# Patient Record
Sex: MALE | NOT HISPANIC OR LATINO | Employment: OTHER | URBAN - METROPOLITAN AREA
[De-identification: names, ages, dates, MRNs, and addresses within clinical notes are randomized per-mention and may not be internally consistent; named-entity substitution may affect disease eponyms.]

---

## 2023-08-16 ENCOUNTER — NURSING HOME VISIT (OUTPATIENT)
Dept: POST ACUTE CARE | Facility: EXTERNAL LOCATION | Age: 88
End: 2023-08-16
Payer: MEDICARE

## 2023-08-16 VITALS — DIASTOLIC BLOOD PRESSURE: 60 MMHG | SYSTOLIC BLOOD PRESSURE: 100 MMHG

## 2023-08-16 DIAGNOSIS — R63.0 LOSS OF APPETITE: ICD-10-CM

## 2023-08-16 DIAGNOSIS — R19.7 DIARRHEA, UNSPECIFIED TYPE: ICD-10-CM

## 2023-08-16 DIAGNOSIS — G30.1 MODERATE LATE ONSET ALZHEIMER'S DEMENTIA WITHOUT BEHAVIORAL DISTURBANCE, PSYCHOTIC DISTURBANCE, MOOD DISTURBANCE, OR ANXIETY (MULTI): Primary | ICD-10-CM

## 2023-08-16 DIAGNOSIS — E11.22 TYPE 2 DIABETES MELLITUS WITH STAGE 2 CHRONIC KIDNEY DISEASE, WITHOUT LONG-TERM CURRENT USE OF INSULIN (MULTI): ICD-10-CM

## 2023-08-16 DIAGNOSIS — F02.B0 MODERATE LATE ONSET ALZHEIMER'S DEMENTIA WITHOUT BEHAVIORAL DISTURBANCE, PSYCHOTIC DISTURBANCE, MOOD DISTURBANCE, OR ANXIETY (MULTI): Primary | ICD-10-CM

## 2023-08-16 DIAGNOSIS — N18.2 TYPE 2 DIABETES MELLITUS WITH STAGE 2 CHRONIC KIDNEY DISEASE, WITHOUT LONG-TERM CURRENT USE OF INSULIN (MULTI): ICD-10-CM

## 2023-08-16 PROBLEM — Z85.46 PERSONAL HISTORY OF MALIGNANT NEOPLASM OF PROSTATE: Status: ACTIVE | Noted: 2023-08-16

## 2023-08-16 PROBLEM — I10 PRIMARY HYPERTENSION: Status: ACTIVE | Noted: 2023-08-16

## 2023-08-16 PROBLEM — Z85.528 PERSONAL HISTORY OF MALIGNANT NEOPLASM OF KIDNEY: Status: ACTIVE | Noted: 2023-08-16

## 2023-08-16 PROCEDURE — 99342 HOME/RES VST NEW LOW MDM 30: CPT | Performed by: NURSE PRACTITIONER

## 2023-08-16 NOTE — LETTER
Patient: Alessandra Dong  : 1935    Encounter Date: 2023    Subjective  Alessandra Dong is a 87 y.o. male who is assisted living/ home patient being seen and evaluated for multiple medical problems.    Resident seen today, his wife is requesting a transfer of services to Dr Jones as it is too hard for her to get him to the outside appointments. He is an 87y old male with PMH of DM 2, dementia,HTN,CKD and malignant neoplasm of the prostate. His initial admission date is from 23. Staff has concerns because he has been having some diarrhea and he is not eating or drinking. Discussed with his wife. Medications reviewed, he follows with a cardiologist, Dr Faustin. Prior medical records are not currently found    50% of time was spent on preparing to see the patient, performing exam or evaluation, coordination of care, ordering medications,tests and labs, referring and communicating with other health care providers , interpreting results, obtaining and reviewing history, tests, medications and documenting clinical information  EMR. Time spent >35 minutes             Objective  /60     Physical Exam  Vitals and nursing note reviewed.   Constitutional:       General: He is not in acute distress.  HENT:      Head: Normocephalic and atraumatic.   Eyes:      Pupils: Pupils are equal, round, and reactive to light.   Cardiovascular:      Rate and Rhythm: Normal rate and regular rhythm.   Pulmonary:      Effort: Pulmonary effort is normal.      Breath sounds: Normal breath sounds.   Abdominal:      General: Bowel sounds are normal. There is no distension.      Palpations: Abdomen is soft. There is no mass.      Tenderness: There is no abdominal tenderness. There is no guarding.   Skin:     General: Skin is warm and dry.   Neurological:      Mental Status: He is alert. He is disoriented.   Psychiatric:         Mood and Affect: Mood normal.         Assessment/Plan  Problem List Items Addressed This  Visit       Type 2 diabetes mellitus with stage 2 chronic kidney disease, without long-term current use of insulin (CMS/[facility])     Check A1C         Moderate late onset Alzheimer's dementia without behavioral disturbance, psychotic disturbance, mood disturbance, or anxiety (CMS/[facility]) - Primary     Patient stable, will continue same treatment and monitor. Nursing to inform CNP/MD of any changes in condition or new problems         Loss of appetite     After checking blood work and KUB will consider starting Remeron for appetite stimulant          Other Visit Diagnoses       Diarrhea, unspecified type        Obtain KUB  Imodium 1-2 tabs daily after episode  Check CBC and BMP              Electronically Signed By: АНДРЕЙ Keyes   8/16/23  4:13 PM

## 2023-08-16 NOTE — PROGRESS NOTES
Subjective   Alessandra Dong is a 87 y.o. male who is assisted living/ home patient being seen and evaluated for multiple medical problems.    Resident seen today, his wife is requesting a transfer of services to Dr Burgos and NATHANIEL as it is too hard for her to get him to the outside appointments. He is an 87y old male with PMH of DM 2, dementia,HTN,CKD and malignant neoplasm of the prostate. His initial admission date is from 7/21/23. Staff has concerns because he has been having some diarrhea and he is not eating or drinking. Discussed with his wife. Medications reviewed, he follows with a cardiologist, Dr Faustin. Prior medical records are not currently found    50% of time was spent on preparing to see the patient, performing exam or evaluation, coordination of care, ordering medications,tests and labs, referring and communicating with other health care providers , interpreting results, obtaining and reviewing history, tests, medications and documenting clinical information  EMR. Time spent >35 minutes             Objective   /60     Physical Exam  Vitals and nursing note reviewed.   Constitutional:       General: He is not in acute distress.  HENT:      Head: Normocephalic and atraumatic.   Eyes:      Pupils: Pupils are equal, round, and reactive to light.   Cardiovascular:      Rate and Rhythm: Normal rate and regular rhythm.   Pulmonary:      Effort: Pulmonary effort is normal.      Breath sounds: Normal breath sounds.   Abdominal:      General: Bowel sounds are normal. There is no distension.      Palpations: Abdomen is soft. There is no mass.      Tenderness: There is no abdominal tenderness. There is no guarding.   Skin:     General: Skin is warm and dry.   Neurological:      Mental Status: He is alert. He is disoriented.   Psychiatric:         Mood and Affect: Mood normal.         Assessment/Plan   Problem List Items Addressed This Visit       Type 2 diabetes mellitus with stage 2 chronic kidney  disease, without long-term current use of insulin (CMS/[facility])     Check A1C         Moderate late onset Alzheimer's dementia without behavioral disturbance, psychotic disturbance, mood disturbance, or anxiety (CMS/[facility]) - Primary     Patient stable, will continue same treatment and monitor. Nursing to inform CNP/MD of any changes in condition or new problems         Loss of appetite     After checking blood work and KUB will consider starting Remeron for appetite stimulant          Other Visit Diagnoses       Diarrhea, unspecified type        Obtain KUB  Imodium 1-2 tabs daily after episode  Check CBC and BMP

## 2023-08-21 ENCOUNTER — NURSING HOME VISIT (OUTPATIENT)
Dept: POST ACUTE CARE | Facility: EXTERNAL LOCATION | Age: 88
End: 2023-08-21
Payer: MEDICARE

## 2023-08-21 DIAGNOSIS — Z85.46 PERSONAL HISTORY OF MALIGNANT NEOPLASM OF PROSTATE: ICD-10-CM

## 2023-08-21 DIAGNOSIS — N18.2 TYPE 2 DIABETES MELLITUS WITH STAGE 2 CHRONIC KIDNEY DISEASE, WITHOUT LONG-TERM CURRENT USE OF INSULIN (MULTI): Primary | ICD-10-CM

## 2023-08-21 DIAGNOSIS — E87.6 HYPOKALEMIA: ICD-10-CM

## 2023-08-21 DIAGNOSIS — E11.22 TYPE 2 DIABETES MELLITUS WITH STAGE 2 CHRONIC KIDNEY DISEASE, WITHOUT LONG-TERM CURRENT USE OF INSULIN (MULTI): Primary | ICD-10-CM

## 2023-08-21 DIAGNOSIS — E87.1 HYPONATREMIA: ICD-10-CM

## 2023-08-21 DIAGNOSIS — G30.1 MODERATE LATE ONSET ALZHEIMER'S DEMENTIA WITHOUT BEHAVIORAL DISTURBANCE, PSYCHOTIC DISTURBANCE, MOOD DISTURBANCE, OR ANXIETY (MULTI): ICD-10-CM

## 2023-08-21 DIAGNOSIS — R63.0 LOSS OF APPETITE: ICD-10-CM

## 2023-08-21 DIAGNOSIS — F02.B0 MODERATE LATE ONSET ALZHEIMER'S DEMENTIA WITHOUT BEHAVIORAL DISTURBANCE, PSYCHOTIC DISTURBANCE, MOOD DISTURBANCE, OR ANXIETY (MULTI): ICD-10-CM

## 2023-08-21 DIAGNOSIS — R19.7 DIARRHEA, UNSPECIFIED TYPE: ICD-10-CM

## 2023-08-21 DIAGNOSIS — I10 PRIMARY HYPERTENSION: ICD-10-CM

## 2023-08-21 PROCEDURE — 99349 HOME/RES VST EST MOD MDM 40: CPT | Performed by: INTERNAL MEDICINE

## 2023-08-21 NOTE — LETTER
Patient: Alessandra Dong  : 1935    Encounter Date: 2023    Follow-up visit  Patient has no specific complaints.  Staff reports that the diarrhea has improved if not resolved.  C. difficile stool samples were not processed.    Labs demonstrate a BUN and creatinine of 49 and 2.46 respectively.  Sodium is borderline low at 135 potassium is markedly low at 2.8.  Hemoglobin is 10.3 B12 was 459.  White count is within normal limits.  KUB was unremarkable.    Medications and orders were reviewed.    Review of systems  GEN patient has no specific complaints.  No complaints of pain.  No complaints of orthostatic dizziness.  No GI upset or abdominal pain no shortness of breath.  Staff does report that the patient still has a poor appetite.    Physical exam  Blood pressure is 119/68 and blood pressure trends tend to be borderline low normal, temp is 96, pulse is 90, pulse ox is 95% on room air  HEENT is unremarkable.  Lungs are clear heart rate and rhythm is regular.  Abdomen is soft nontender.  There is no peripheral edema.  Neurologically patient demonstrates no focal tremors or deficits    Assessment and care plan  Advanced dementia with likely a component of adult failure to thrive.  Some of this may be also due to adjustment to the new facility.    The patient did have diarrhea but this appears to have improved although we will monitor this closely.  Because the patient's potassium is low at 2.8 we will add potassium supplementation of 20 mEq twice a day for 6 doses and then 10 mEq daily.  In the meantime since his blood pressure is low normal and there is no edema we will DC his hydrochlorothiazide which is likely not as effective given his advanced kidney disease.  We will decrease his losartan to 50 mg daily and monitor blood pressures and weights accordingly.    Patient does have a history of diabetes mellitus type 2 so we will monitor his Accu-Cheks twice a day and adjust diabetic medications  accordingly.    Because of the patient's abnormal labs we will repeat a CBC magnesium with iron panel folate hemoglobin A1c next week.  We will also pursue a stool for C. difficile if the diarrhea recurs      Electronically Signed By: Tanmay Burgos DO   8/21/23  1:49 PM

## 2023-08-21 NOTE — PROGRESS NOTES
Follow-up visit  Patient has no specific complaints.  Staff reports that the diarrhea has improved if not resolved.  C. difficile stool samples were not processed.    Labs demonstrate a BUN and creatinine of 49 and 2.46 respectively.  Sodium is borderline low at 135 potassium is markedly low at 2.8.  Hemoglobin is 10.3 B12 was 459.  White count is within normal limits.  KUB was unremarkable.    Medications and orders were reviewed.    Review of systems  GEN patient has no specific complaints.  No complaints of pain.  No complaints of orthostatic dizziness.  No GI upset or abdominal pain no shortness of breath.  Staff does report that the patient still has a poor appetite.    Physical exam  Blood pressure is 119/68 and blood pressure trends tend to be borderline low normal, temp is 96, pulse is 90, pulse ox is 95% on room air  HEENT is unremarkable.  Lungs are clear heart rate and rhythm is regular.  Abdomen is soft nontender.  There is no peripheral edema.  Neurologically patient demonstrates no focal tremors or deficits    Assessment and care plan  Advanced dementia with likely a component of adult failure to thrive.  Some of this may be also due to adjustment to the new facility.    The patient did have diarrhea but this appears to have improved although we will monitor this closely.  Because the patient's potassium is low at 2.8 we will add potassium supplementation of 20 mEq twice a day for 6 doses and then 10 mEq daily.  In the meantime since his blood pressure is low normal and there is no edema we will DC his hydrochlorothiazide which is likely not as effective given his advanced kidney disease.  We will decrease his losartan to 50 mg daily and monitor blood pressures and weights accordingly.    Patient does have a history of diabetes mellitus type 2 so we will monitor his Accu-Cheks twice a day and adjust diabetic medications accordingly.    Because of the patient's abnormal labs we will repeat a CBC  magnesium with iron panel folate hemoglobin A1c next week.  We will also pursue a stool for C. difficile if the diarrhea recurs

## 2023-08-29 ENCOUNTER — NURSING HOME VISIT (OUTPATIENT)
Dept: POST ACUTE CARE | Facility: EXTERNAL LOCATION | Age: 88
End: 2023-08-29
Payer: MEDICARE

## 2023-08-29 DIAGNOSIS — G30.1 MODERATE LATE ONSET ALZHEIMER'S DEMENTIA WITHOUT BEHAVIORAL DISTURBANCE, PSYCHOTIC DISTURBANCE, MOOD DISTURBANCE, OR ANXIETY (MULTI): ICD-10-CM

## 2023-08-29 DIAGNOSIS — F02.B0 MODERATE LATE ONSET ALZHEIMER'S DEMENTIA WITHOUT BEHAVIORAL DISTURBANCE, PSYCHOTIC DISTURBANCE, MOOD DISTURBANCE, OR ANXIETY (MULTI): ICD-10-CM

## 2023-08-29 DIAGNOSIS — N18.2 TYPE 2 DIABETES MELLITUS WITH STAGE 2 CHRONIC KIDNEY DISEASE, WITHOUT LONG-TERM CURRENT USE OF INSULIN (MULTI): Primary | ICD-10-CM

## 2023-08-29 DIAGNOSIS — E11.22 TYPE 2 DIABETES MELLITUS WITH STAGE 2 CHRONIC KIDNEY DISEASE, WITHOUT LONG-TERM CURRENT USE OF INSULIN (MULTI): Primary | ICD-10-CM

## 2023-08-29 DIAGNOSIS — L89.152 PRESSURE INJURY OF SACRAL REGION, STAGE 2 (MULTI): ICD-10-CM

## 2023-08-29 DIAGNOSIS — I10 PRIMARY HYPERTENSION: ICD-10-CM

## 2023-08-29 PROCEDURE — 99348 HOME/RES VST EST LOW MDM 30: CPT | Performed by: INTERNAL MEDICINE

## 2023-08-29 NOTE — PROGRESS NOTES
Follow-up visit  Patient has no specific complaints.  Staff reports stage II pressure ulcer on the sacrum and so we have enlisted home health care to help manage this.  Blood pressures are on the low normal side.  He has had a couple of recent falls with no significant injuries.    Glucose trends are acceptable.  Anticipated labs are still pending.    Physical exam  Blood pressure is 109/69, temp is 97.8, pulse 74, pulse ox is 96% on room air he is alert pleasant confused lungs are clear heart rate and rhythm is regular legs demonstrate no edema.    Assessment and care plan advanced dementia but otherwise stable from this standpoint.    Hypertension by history of blood pressures have been low normal and given some recent falls we will continue to titrate down his blood pressure medications decreasing his losartan to 25 mg daily.    His diabetes seems reasonably controlled for the time being we will continue his current medications and monitor Accu-Cheks    Further adjustments in medications will be dependent upon anticipated labs this week

## 2023-08-29 NOTE — LETTER
Patient: Alessandra Dong  : 1935    Encounter Date: 2023    Follow-up visit  Patient has no specific complaints.  Staff reports stage II pressure ulcer on the sacrum and so we have enlisted home health care to help manage this.  Blood pressures are on the low normal side.  He has had a couple of recent falls with no significant injuries.    Glucose trends are acceptable.  Anticipated labs are still pending.    Physical exam  Blood pressure is 109/69, temp is 97.8, pulse 74, pulse ox is 96% on room air he is alert pleasant confused lungs are clear heart rate and rhythm is regular legs demonstrate no edema.    Assessment and care plan advanced dementia but otherwise stable from this standpoint.    Hypertension by history of blood pressures have been low normal and given some recent falls we will continue to titrate down his blood pressure medications decreasing his losartan to 25 mg daily.    His diabetes seems reasonably controlled for the time being we will continue his current medications and monitor Accu-Cheks    Further adjustments in medications will be dependent upon anticipated labs this week      Electronically Signed By: Tanmay Burgos DO   23 10:11 AM

## 2023-09-26 ENCOUNTER — HOSPITAL ENCOUNTER (INPATIENT)
Dept: DATA CONVERSION | Facility: HOSPITAL | Age: 88
LOS: 2 days | Discharge: HOME | DRG: 689 | End: 2023-09-29
Attending: STUDENT IN AN ORGANIZED HEALTH CARE EDUCATION/TRAINING PROGRAM | Admitting: STUDENT IN AN ORGANIZED HEALTH CARE EDUCATION/TRAINING PROGRAM
Payer: MEDICARE

## 2023-09-26 LAB
ACTIVATED PARTIAL THROMBOPLASTIN TIME IN PPP BY COAGULATION ASSAY: 30 SEC (ref 27–38)
ALANINE AMINOTRANSFERASE (SGPT) (U/L) IN SER/PLAS: 25 U/L (ref 10–52)
ALBUMIN (G/DL) IN SER/PLAS: 3.1 G/DL (ref 3.4–5)
ALKALINE PHOSPHATASE (U/L) IN SER/PLAS: 62 U/L (ref 33–136)
ANION GAP IN SER/PLAS: 12 MMOL/L (ref 10–20)
APPEARANCE, URINE: ABNORMAL
ASPARTATE AMINOTRANSFERASE (SGOT) (U/L) IN SER/PLAS: 32 U/L (ref 9–39)
BACTERIA, URINE: ABNORMAL /HPF
BASOPHILS (10*3/UL) IN BLOOD BY AUTOMATED COUNT: 0.04 X10E9/L (ref 0–0.1)
BASOPHILS/100 LEUKOCYTES IN BLOOD BY AUTOMATED COUNT: 0.5 % (ref 0–2)
BILIRUBIN TOTAL (MG/DL) IN SER/PLAS: 0.6 MG/DL (ref 0–1.2)
BILIRUBIN, URINE: NEGATIVE
BLOOD, URINE: ABNORMAL
CALCIUM (MG/DL) IN SER/PLAS: 9.6 MG/DL (ref 8.6–10.3)
CARBON DIOXIDE, TOTAL (MMOL/L) IN SER/PLAS: 24 MMOL/L (ref 21–32)
CHLORIDE (MMOL/L) IN SER/PLAS: 103 MMOL/L (ref 98–107)
COLOR, URINE: ABNORMAL
CREATININE (MG/DL) IN SER/PLAS: 2.43 MG/DL (ref 0.5–1.3)
EOSINOPHILS (10*3/UL) IN BLOOD BY AUTOMATED COUNT: 0.05 X10E9/L (ref 0–0.4)
EOSINOPHILS/100 LEUKOCYTES IN BLOOD BY AUTOMATED COUNT: 0.6 % (ref 0–6)
ERYTHROCYTE DISTRIBUTION WIDTH (RATIO) BY AUTOMATED COUNT: 14.4 % (ref 11.5–14.5)
ERYTHROCYTE MEAN CORPUSCULAR HEMOGLOBIN CONCENTRATION (G/DL) BY AUTOMATED: 33.5 G/DL (ref 32–36)
ERYTHROCYTE MEAN CORPUSCULAR VOLUME (FL) BY AUTOMATED COUNT: 99 FL (ref 80–100)
ERYTHROCYTES (10*6/UL) IN BLOOD BY AUTOMATED COUNT: 3.21 X10E12/L (ref 4.5–5.9)
GFR MALE: 25 ML/MIN/1.73M2
GLUCOSE (MG/DL) IN SER/PLAS: 135 MG/DL (ref 74–99)
GLUCOSE, URINE: NEGATIVE MG/DL
HEMATOCRIT (%) IN BLOOD BY AUTOMATED COUNT: 31.9 % (ref 41–52)
HEMOGLOBIN (G/DL) IN BLOOD: 10.7 G/DL (ref 13.5–17.5)
IMMATURE GRANULOCYTES/100 LEUKOCYTES IN BLOOD BY AUTOMATED COUNT: 0.6 % (ref 0–0.9)
INR IN PPP BY COAGULATION ASSAY: 1.2 (ref 0.9–1.1)
KETONES, URINE: NEGATIVE MG/DL
LACTATE (MMOL/L) IN SER/PLAS: 1.7 MMOL/L (ref 0.4–2)
LEUKOCYTE ESTERASE, URINE: ABNORMAL
LEUKOCYTES (10*3/UL) IN BLOOD BY AUTOMATED COUNT: 7.8 X10E9/L (ref 4.4–11.3)
LYMPHOCYTES (10*3/UL) IN BLOOD BY AUTOMATED COUNT: 2.51 X10E9/L (ref 0.8–3)
LYMPHOCYTES/100 LEUKOCYTES IN BLOOD BY AUTOMATED COUNT: 32.1 % (ref 13–44)
MONOCYTES (10*3/UL) IN BLOOD BY AUTOMATED COUNT: 0.88 X10E9/L (ref 0.05–0.8)
MONOCYTES/100 LEUKOCYTES IN BLOOD BY AUTOMATED COUNT: 11.3 % (ref 2–10)
NEUTROPHILS (10*3/UL) IN BLOOD BY AUTOMATED COUNT: 4.28 X10E9/L (ref 1.6–5.5)
NEUTROPHILS/100 LEUKOCYTES IN BLOOD BY AUTOMATED COUNT: 54.9 % (ref 40–80)
NITRITE, URINE: NEGATIVE
NRBC (PER 100 WBCS) BY AUTOMATED COUNT: 0 /100 WBC (ref 0–0)
PH, URINE: 5 (ref 5–8)
PLATELETS (10*3/UL) IN BLOOD AUTOMATED COUNT: 187 X10E9/L (ref 150–450)
POCT GLUCOSE: 106 MG/DL (ref 74–99)
POCT GLUCOSE: 93 MG/DL (ref 74–99)
POTASSIUM (MMOL/L) IN SER/PLAS: 4.2 MMOL/L (ref 3.5–5.3)
PROTEIN TOTAL: 5.9 G/DL (ref 6.4–8.2)
PROTEIN, URINE: ABNORMAL MG/DL
PROTHROMBIN TIME (PT) IN PPP BY COAGULATION ASSAY: 13.2 SEC (ref 9.8–12.8)
RBC, URINE: ABNORMAL /HPF (ref 0–5)
SARS-COV-2 RESULT: NOT DETECTED
SODIUM (MMOL/L) IN SER/PLAS: 135 MMOL/L (ref 136–145)
SPECIFIC GRAVITY, URINE: 1.01 (ref 1–1.03)
TROPONIN I, HIGH SENSITIVITY: 159 NG/L (ref 0–20)
TROPONIN I, HIGH SENSITIVITY: 208 NG/L (ref 0–20)
TROPONIN I, HIGH SENSITIVITY: 231 NG/L (ref 0–20)
UREA NITROGEN (MG/DL) IN SER/PLAS: 34 MG/DL (ref 6–23)
UROBILINOGEN, URINE: <2 MG/DL (ref 0–1.9)
WBC, URINE: >100 /HPF (ref 0–5)

## 2023-09-26 PROCEDURE — 94761 N-INVAS EAR/PLS OXIMETRY MLT: CPT

## 2023-09-26 PROCEDURE — 87635 SARS-COV-2 COVID-19 AMP PRB: CPT

## 2023-09-26 PROCEDURE — 85025 COMPLETE CBC W/AUTO DIFF WBC: CPT

## 2023-09-26 PROCEDURE — 85730 THROMBOPLASTIN TIME PARTIAL: CPT

## 2023-09-26 PROCEDURE — 85610 PROTHROMBIN TIME: CPT

## 2023-09-26 PROCEDURE — 87040 BLOOD CULTURE FOR BACTERIA: CPT | Mod: 59

## 2023-09-26 PROCEDURE — 87086 URINE CULTURE/COLONY COUNT: CPT

## 2023-09-26 PROCEDURE — 93005 ELECTROCARDIOGRAM TRACING: CPT

## 2023-09-26 PROCEDURE — 80053 COMPREHEN METABOLIC PANEL: CPT

## 2023-09-26 PROCEDURE — 36415 COLL VENOUS BLD VENIPUNCTURE: CPT

## 2023-09-26 PROCEDURE — 71045 X-RAY EXAM CHEST 1 VIEW: CPT

## 2023-09-26 PROCEDURE — 87077 CULTURE AEROBIC IDENTIFY: CPT | Mod: 59

## 2023-09-26 PROCEDURE — 83605 ASSAY OF LACTIC ACID: CPT

## 2023-09-26 PROCEDURE — 84484 ASSAY OF TROPONIN QUANT: CPT | Mod: 91

## 2023-09-26 PROCEDURE — 82947 ASSAY GLUCOSE BLOOD QUANT: CPT | Mod: 91

## 2023-09-26 PROCEDURE — 99285 EMERGENCY DEPT VISIT HI MDM: CPT | Mod: 25

## 2023-09-26 PROCEDURE — 81001 URINALYSIS AUTO W/SCOPE: CPT

## 2023-09-26 PROCEDURE — 87081 CULTURE SCREEN ONLY: CPT | Mod: 59

## 2023-09-26 PROCEDURE — 87186 SC STD MICRODIL/AGAR DIL: CPT

## 2023-09-26 PROCEDURE — 9990 CHARGE CONVERSION: Mod: 59

## 2023-09-27 LAB
ANION GAP IN SER/PLAS: 14 MMOL/L (ref 10–20)
CALCIUM (MG/DL) IN SER/PLAS: 9.2 MG/DL (ref 8.6–10.3)
CARBON DIOXIDE, TOTAL (MMOL/L) IN SER/PLAS: 20 MMOL/L (ref 21–32)
CHLORIDE (MMOL/L) IN SER/PLAS: 108 MMOL/L (ref 98–107)
CREATININE (MG/DL) IN SER/PLAS: 1.83 MG/DL (ref 0.5–1.3)
ERYTHROCYTE DISTRIBUTION WIDTH (RATIO) BY AUTOMATED COUNT: 14.5 % (ref 11.5–14.5)
ERYTHROCYTE MEAN CORPUSCULAR HEMOGLOBIN CONCENTRATION (G/DL) BY AUTOMATED: 32.4 G/DL (ref 32–36)
ERYTHROCYTE MEAN CORPUSCULAR VOLUME (FL) BY AUTOMATED COUNT: 99 FL (ref 80–100)
ERYTHROCYTES (10*6/UL) IN BLOOD BY AUTOMATED COUNT: 2.93 X10E12/L (ref 4.5–5.9)
GFR MALE: 35 ML/MIN/1.73M2
GLUCOSE (MG/DL) IN SER/PLAS: 69 MG/DL (ref 74–99)
HEMATOCRIT (%) IN BLOOD BY AUTOMATED COUNT: 29 % (ref 41–52)
HEMOGLOBIN (G/DL) IN BLOOD: 9.4 G/DL (ref 13.5–17.5)
LEUKOCYTES (10*3/UL) IN BLOOD BY AUTOMATED COUNT: 7.9 X10E9/L (ref 4.4–11.3)
MAGNESIUM (MG/DL) IN SER/PLAS: 1.69 MG/DL (ref 1.6–2.4)
NRBC (PER 100 WBCS) BY AUTOMATED COUNT: 0 /100 WBC (ref 0–0)
PHOSPHATE (MG/DL) IN SER/PLAS: 3.3 MG/DL (ref 2.5–4.9)
PLATELETS (10*3/UL) IN BLOOD AUTOMATED COUNT: 191 X10E9/L (ref 150–450)
POCT GLUCOSE: 68 MG/DL (ref 74–99)
POCT GLUCOSE: 81 MG/DL (ref 74–99)
POCT GLUCOSE: 83 MG/DL (ref 74–99)
POCT GLUCOSE: 88 MG/DL (ref 74–99)
POCT GLUCOSE: 94 MG/DL (ref 74–99)
POTASSIUM (MMOL/L) IN SER/PLAS: 3.8 MMOL/L (ref 3.5–5.3)
SODIUM (MMOL/L) IN SER/PLAS: 138 MMOL/L (ref 136–145)
UREA NITROGEN (MG/DL) IN SER/PLAS: 29 MG/DL (ref 6–23)
VANCOMYCIN (UG/ML) IN SER/PLAS - TROUGH: 8.9 UG/ML (ref 5–20)

## 2023-09-27 PROCEDURE — 85025 COMPLETE CBC W/AUTO DIFF WBC: CPT

## 2023-09-27 PROCEDURE — 84100 ASSAY OF PHOSPHORUS: CPT

## 2023-09-27 PROCEDURE — 99285 EMERGENCY DEPT VISIT HI MDM: CPT

## 2023-09-27 PROCEDURE — 80202 ASSAY OF VANCOMYCIN: CPT

## 2023-09-27 PROCEDURE — 80053 COMPREHEN METABOLIC PANEL: CPT

## 2023-09-27 PROCEDURE — 94761 N-INVAS EAR/PLS OXIMETRY MLT: CPT

## 2023-09-27 PROCEDURE — 81001 URINALYSIS AUTO W/SCOPE: CPT

## 2023-09-27 PROCEDURE — 83605 ASSAY OF LACTIC ACID: CPT

## 2023-09-27 PROCEDURE — 85027 COMPLETE CBC AUTOMATED: CPT

## 2023-09-27 PROCEDURE — 87635 SARS-COV-2 COVID-19 AMP PRB: CPT

## 2023-09-27 PROCEDURE — 85730 THROMBOPLASTIN TIME PARTIAL: CPT

## 2023-09-27 PROCEDURE — 87086 URINE CULTURE/COLONY COUNT: CPT

## 2023-09-27 PROCEDURE — 85610 PROTHROMBIN TIME: CPT

## 2023-09-27 PROCEDURE — 84484 ASSAY OF TROPONIN QUANT: CPT | Mod: 91

## 2023-09-27 PROCEDURE — 82947 ASSAY GLUCOSE BLOOD QUANT: CPT | Mod: 91

## 2023-09-27 PROCEDURE — 87040 BLOOD CULTURE FOR BACTERIA: CPT | Mod: 59

## 2023-09-27 PROCEDURE — 80048 BASIC METABOLIC PNL TOTAL CA: CPT

## 2023-09-27 PROCEDURE — 9990 CHARGE CONVERSION

## 2023-09-27 PROCEDURE — 83735 ASSAY OF MAGNESIUM: CPT

## 2023-09-28 LAB
ANION GAP IN SER/PLAS: 12 MMOL/L (ref 10–20)
CALCIUM (MG/DL) IN SER/PLAS: 9.2 MG/DL (ref 8.6–10.3)
CARBON DIOXIDE, TOTAL (MMOL/L) IN SER/PLAS: 24 MMOL/L (ref 21–32)
CHLORIDE (MMOL/L) IN SER/PLAS: 106 MMOL/L (ref 98–107)
CREATININE (MG/DL) IN SER/PLAS: 1.63 MG/DL (ref 0.5–1.3)
ERYTHROCYTE DISTRIBUTION WIDTH (RATIO) BY AUTOMATED COUNT: 14.4 % (ref 11.5–14.5)
ERYTHROCYTE MEAN CORPUSCULAR HEMOGLOBIN CONCENTRATION (G/DL) BY AUTOMATED: 32.9 G/DL (ref 32–36)
ERYTHROCYTE MEAN CORPUSCULAR VOLUME (FL) BY AUTOMATED COUNT: 99 FL (ref 80–100)
ERYTHROCYTES (10*6/UL) IN BLOOD BY AUTOMATED COUNT: 3.04 X10E12/L (ref 4.5–5.9)
GFR MALE: 40 ML/MIN/1.73M2
GLUCOSE (MG/DL) IN SER/PLAS: 120 MG/DL (ref 74–99)
HEMATOCRIT (%) IN BLOOD BY AUTOMATED COUNT: 30.1 % (ref 41–52)
HEMOGLOBIN (G/DL) IN BLOOD: 9.9 G/DL (ref 13.5–17.5)
LEUKOCYTES (10*3/UL) IN BLOOD BY AUTOMATED COUNT: 7.4 X10E9/L (ref 4.4–11.3)
MAGNESIUM (MG/DL) IN SER/PLAS: 1.79 MG/DL (ref 1.6–2.4)
NRBC (PER 100 WBCS) BY AUTOMATED COUNT: 0 /100 WBC (ref 0–0)
PLATELETS (10*3/UL) IN BLOOD AUTOMATED COUNT: 204 X10E9/L (ref 150–450)
POCT GLUCOSE: 122 MG/DL (ref 74–99)
POCT GLUCOSE: 149 MG/DL (ref 74–99)
POCT GLUCOSE: 171 MG/DL (ref 74–99)
POCT GLUCOSE: 194 MG/DL (ref 74–99)
POTASSIUM (MMOL/L) IN SER/PLAS: 3.7 MMOL/L (ref 3.5–5.3)
SODIUM (MMOL/L) IN SER/PLAS: 138 MMOL/L (ref 136–145)
STAPH/MRSA SCREEN, CULTURE: NORMAL
UREA NITROGEN (MG/DL) IN SER/PLAS: 28 MG/DL (ref 6–23)
URINE CULTURE: ABNORMAL
VANCOMYCIN (UG/ML) IN SER/PLAS - TROUGH: 11.4 UG/ML (ref 5–20)

## 2023-09-28 PROCEDURE — 84100 ASSAY OF PHOSPHORUS: CPT

## 2023-09-28 PROCEDURE — 80048 BASIC METABOLIC PNL TOTAL CA: CPT

## 2023-09-28 PROCEDURE — 87186 SC STD MICRODIL/AGAR DIL: CPT

## 2023-09-28 PROCEDURE — 9990 CHARGE CONVERSION

## 2023-09-28 PROCEDURE — 85027 COMPLETE CBC AUTOMATED: CPT

## 2023-09-28 PROCEDURE — 87040 BLOOD CULTURE FOR BACTERIA: CPT | Mod: 59

## 2023-09-28 PROCEDURE — 36415 COLL VENOUS BLD VENIPUNCTURE: CPT

## 2023-09-28 PROCEDURE — 80202 ASSAY OF VANCOMYCIN: CPT

## 2023-09-28 PROCEDURE — 83735 ASSAY OF MAGNESIUM: CPT

## 2023-09-28 PROCEDURE — 87077 CULTURE AEROBIC IDENTIFY: CPT

## 2023-09-28 PROCEDURE — 84484 ASSAY OF TROPONIN QUANT: CPT

## 2023-09-28 PROCEDURE — 82947 ASSAY GLUCOSE BLOOD QUANT: CPT | Mod: 91

## 2023-09-28 PROCEDURE — 87081 CULTURE SCREEN ONLY: CPT | Mod: 59

## 2023-09-29 VITALS — HEIGHT: 73 IN | WEIGHT: 200.4 LBS | BODY MASS INDEX: 26.56 KG/M2

## 2023-09-29 LAB
ANION GAP IN SER/PLAS: 10 MMOL/L (ref 10–20)
CALCIUM (MG/DL) IN SER/PLAS: 8.7 MG/DL (ref 8.6–10.3)
CARBON DIOXIDE, TOTAL (MMOL/L) IN SER/PLAS: 24 MMOL/L (ref 21–32)
CHLORIDE (MMOL/L) IN SER/PLAS: 108 MMOL/L (ref 98–107)
CREATININE (MG/DL) IN SER/PLAS: 1.5 MG/DL (ref 0.5–1.3)
ERYTHROCYTE DISTRIBUTION WIDTH (RATIO) BY AUTOMATED COUNT: 14.4 % (ref 11.5–14.5)
ERYTHROCYTE MEAN CORPUSCULAR HEMOGLOBIN CONCENTRATION (G/DL) BY AUTOMATED: 32.5 G/DL (ref 32–36)
ERYTHROCYTE MEAN CORPUSCULAR VOLUME (FL) BY AUTOMATED COUNT: 100 FL (ref 80–100)
ERYTHROCYTES (10*6/UL) IN BLOOD BY AUTOMATED COUNT: 2.88 X10E12/L (ref 4.5–5.9)
GFR MALE: 45 ML/MIN/1.73M2
GLUCOSE (MG/DL) IN SER/PLAS: 115 MG/DL (ref 74–99)
HEMATOCRIT (%) IN BLOOD BY AUTOMATED COUNT: 28.9 % (ref 41–52)
HEMOGLOBIN (G/DL) IN BLOOD: 9.4 G/DL (ref 13.5–17.5)
LEUKOCYTES (10*3/UL) IN BLOOD BY AUTOMATED COUNT: 6 X10E9/L (ref 4.4–11.3)
MAGNESIUM (MG/DL) IN SER/PLAS: 1.7 MG/DL (ref 1.6–2.4)
NRBC (PER 100 WBCS) BY AUTOMATED COUNT: 0 /100 WBC (ref 0–0)
PHOSPHATE (MG/DL) IN SER/PLAS: 2.5 MG/DL (ref 2.5–4.9)
PLATELETS (10*3/UL) IN BLOOD AUTOMATED COUNT: 180 X10E9/L (ref 150–450)
POCT GLUCOSE: 121 MG/DL (ref 74–99)
POCT GLUCOSE: 154 MG/DL (ref 74–99)
POCT GLUCOSE: 171 MG/DL (ref 74–99)
POCT GLUCOSE: 87 MG/DL (ref 74–99)
POTASSIUM (MMOL/L) IN SER/PLAS: 3.4 MMOL/L (ref 3.5–5.3)
SODIUM (MMOL/L) IN SER/PLAS: 139 MMOL/L (ref 136–145)
UREA NITROGEN (MG/DL) IN SER/PLAS: 25 MG/DL (ref 6–23)

## 2023-09-29 PROCEDURE — 9990 CHARGE CONVERSION: Mod: 91

## 2023-09-29 PROCEDURE — 36415 COLL VENOUS BLD VENIPUNCTURE: CPT

## 2023-09-29 PROCEDURE — 84100 ASSAY OF PHOSPHORUS: CPT

## 2023-09-29 PROCEDURE — 80048 BASIC METABOLIC PNL TOTAL CA: CPT

## 2023-09-29 PROCEDURE — 87040 BLOOD CULTURE FOR BACTERIA: CPT | Mod: 59

## 2023-09-29 PROCEDURE — 93005 ELECTROCARDIOGRAM TRACING: CPT

## 2023-09-29 PROCEDURE — 83735 ASSAY OF MAGNESIUM: CPT

## 2023-09-29 PROCEDURE — 85027 COMPLETE CBC AUTOMATED: CPT

## 2023-09-29 PROCEDURE — 80202 ASSAY OF VANCOMYCIN: CPT

## 2023-09-29 PROCEDURE — 92610 EVALUATE SWALLOWING FUNCTION: CPT | Mod: GN

## 2023-09-29 PROCEDURE — 82947 ASSAY GLUCOSE BLOOD QUANT: CPT | Mod: 91

## 2023-09-29 RX ORDER — METFORMIN HYDROCHLORIDE 500 MG/1
500 TABLET ORAL
COMMUNITY

## 2023-09-29 RX ORDER — FAMOTIDINE 20 MG/1
20 TABLET, FILM COATED ORAL 2 TIMES DAILY
COMMUNITY

## 2023-09-29 RX ORDER — ACETAMINOPHEN 325 MG/1
650 TABLET ORAL EVERY 4 HOURS PRN
Status: DISCONTINUED | OUTPATIENT
Start: 2023-09-30 | End: 2023-09-30 | Stop reason: HOSPADM

## 2023-09-29 RX ORDER — POTASSIUM CHLORIDE 750 MG/1
10 TABLET, FILM COATED, EXTENDED RELEASE ORAL DAILY
COMMUNITY

## 2023-09-29 RX ORDER — ASPIRIN 81 MG/1
81 TABLET ORAL DAILY
COMMUNITY

## 2023-09-29 RX ORDER — ONDANSETRON HYDROCHLORIDE 2 MG/ML
4 INJECTION, SOLUTION INTRAVENOUS EVERY 4 HOURS PRN
Status: DISCONTINUED | OUTPATIENT
Start: 2023-09-30 | End: 2023-09-30 | Stop reason: HOSPADM

## 2023-09-29 RX ORDER — HEPARIN SODIUM 5000 [USP'U]/ML
5000 INJECTION, SOLUTION INTRAVENOUS; SUBCUTANEOUS EVERY 8 HOURS
Status: DISCONTINUED | OUTPATIENT
Start: 2023-09-30 | End: 2023-09-30 | Stop reason: HOSPADM

## 2023-09-29 RX ORDER — DEXTROSE 50 % IN WATER (D50W) INTRAVENOUS SYRINGE
25
Status: DISCONTINUED | OUTPATIENT
Start: 2023-09-30 | End: 2023-09-30 | Stop reason: HOSPADM

## 2023-09-29 RX ORDER — MELATONIN 3 MG
1 CAPSULE ORAL NIGHTLY
COMMUNITY

## 2023-09-29 RX ORDER — ASPIRIN 81 MG/1
81 TABLET ORAL DAILY
Status: DISCONTINUED | OUTPATIENT
Start: 2023-09-30 | End: 2023-09-30 | Stop reason: HOSPADM

## 2023-09-29 RX ORDER — FAMOTIDINE 20 MG/1
20 TABLET, FILM COATED ORAL DAILY
Status: DISCONTINUED | OUTPATIENT
Start: 2023-09-30 | End: 2023-09-30 | Stop reason: HOSPADM

## 2023-09-29 RX ORDER — LOSARTAN POTASSIUM 25 MG/1
25 TABLET ORAL DAILY
Status: DISCONTINUED | OUTPATIENT
Start: 2023-09-30 | End: 2023-09-29

## 2023-09-29 RX ORDER — INSULIN LISPRO 100 [IU]/ML
0-10 INJECTION, SOLUTION INTRAVENOUS; SUBCUTANEOUS
Status: DISCONTINUED | OUTPATIENT
Start: 2023-09-30 | End: 2023-09-30 | Stop reason: HOSPADM

## 2023-09-29 RX ORDER — GLIMEPIRIDE 2 MG/1
2 TABLET ORAL
COMMUNITY

## 2023-09-29 RX ORDER — ROSUVASTATIN CALCIUM 20 MG/1
20 TABLET, COATED ORAL DAILY
COMMUNITY

## 2023-09-29 RX ORDER — LIDOCAINE HYDROCHLORIDE 20 MG/ML
1 JELLY TOPICAL EVERY 4 HOURS PRN
Status: DISCONTINUED | OUTPATIENT
Start: 2023-09-30 | End: 2023-09-30 | Stop reason: HOSPADM

## 2023-09-29 RX ORDER — CARVEDILOL 6.25 MG/1
6.25 TABLET ORAL 2 TIMES DAILY
Status: DISCONTINUED | OUTPATIENT
Start: 2023-09-30 | End: 2023-09-30 | Stop reason: HOSPADM

## 2023-09-29 RX ORDER — ROSUVASTATIN CALCIUM 20 MG/1
20 TABLET, COATED ORAL NIGHTLY
Status: DISCONTINUED | OUTPATIENT
Start: 2023-09-30 | End: 2023-09-30 | Stop reason: HOSPADM

## 2023-09-29 RX ORDER — CARVEDILOL 6.25 MG/1
6.25 TABLET ORAL
COMMUNITY

## 2023-09-29 RX ORDER — SODIUM CHLORIDE, SODIUM LACTATE, POTASSIUM CHLORIDE, CALCIUM CHLORIDE 600; 310; 30; 20 MG/100ML; MG/100ML; MG/100ML; MG/100ML
75 INJECTION, SOLUTION INTRAVENOUS CONTINUOUS
Status: DISCONTINUED | OUTPATIENT
Start: 2023-09-30 | End: 2023-09-30 | Stop reason: HOSPADM

## 2023-09-29 RX ORDER — LOSARTAN POTASSIUM 25 MG/1
25 TABLET ORAL DAILY
Status: DISCONTINUED | OUTPATIENT
Start: 2023-09-30 | End: 2023-09-30 | Stop reason: HOSPADM

## 2023-09-29 RX ORDER — CEFAZOLIN SODIUM 1 G/50ML
1 SOLUTION INTRAVENOUS EVERY 8 HOURS
Status: DISCONTINUED | OUTPATIENT
Start: 2023-09-30 | End: 2023-09-30 | Stop reason: HOSPADM

## 2023-09-29 RX ORDER — DOCUSATE SODIUM 100 MG/1
100 CAPSULE, LIQUID FILLED ORAL 2 TIMES DAILY
Status: DISCONTINUED | OUTPATIENT
Start: 2023-09-30 | End: 2023-09-30 | Stop reason: HOSPADM

## 2023-09-29 RX ORDER — LOSARTAN POTASSIUM 25 MG/1
25 TABLET ORAL DAILY
COMMUNITY

## 2023-09-30 LAB — BLOOD CULTURE: NORMAL

## 2023-09-30 PROCEDURE — 83735 ASSAY OF MAGNESIUM: CPT

## 2023-09-30 PROCEDURE — 84100 ASSAY OF PHOSPHORUS: CPT

## 2023-09-30 PROCEDURE — 9990 CHARGE CONVERSION: Mod: 91

## 2023-09-30 PROCEDURE — 80048 BASIC METABOLIC PNL TOTAL CA: CPT

## 2023-09-30 PROCEDURE — 85027 COMPLETE CBC AUTOMATED: CPT

## 2023-09-30 PROCEDURE — 82947 ASSAY GLUCOSE BLOOD QUANT: CPT | Mod: 91

## 2023-09-30 NOTE — H&P
History of Present Illness:   Admission Reason: Increased confusion   HPI:    CHRISTINA THAYER is a 87 year old Male that presents to [facility] ER with chief complaint of altered mental status. His baseline is AO x2 and  he has a history of dementia but nursing staff found him to be confused today.  They also noticed that he had purulent urine output and is incontinent.  EMS found him to be hypotensive with systolic BP in the 80s.  Patient was seen on arrival to medical  floor. Patient's nurse reports urine very odorous and purulent in Carter. Patient is alert but confused and not good historian. Patient was started on IV Zosyn and Vancomycin in the ED.     Past medical history: Hypertension, CKD 3, NSTEMI,  T2DM, Prostrate cancer, renal cell cancer, sleep apnea, dementia, severe stenosis distal LAD, sacral wound stage 2  Past surgical history: None reported  Social history: Never smoked  Family history: Unobtainable    Comorbidities:   Comorbidites   ·  Comorbid Conditions on  H and P            Allergies:  ·  No Known Allergies :     Medications Prior to Admission:   Admission Medication Reconciliation has not been completed for this patient.    Review of Systems:   Incomplete ROS: patient's impaired mental status       Objective:     Objective Information:      T   P  R  BP   MAP  SpO2   Value  36.5  110  18  132/62      94%  Date/Time 9/26 11:01 9/26 12:33 9/26 12:33 9/26 12:33    9/26 12:33  Range  (36.5C - 36.5C )  (110 - 116 )  (18 - 18 )  (132 - 138 )/ (55 - 62 )    (94% - 97% )      Pain reported at 9/26 11:01: 0 = None    Physical Exam by System     Constitutional: Well developed, awake/alert/confused,  NAD   Respiratory/Thorax: Patent airways, CTAB, normal  breath sounds with good chest expansion, thorax symmetric   Cardiovascular: Regular, rate and rhythm, no murmurs,  2+ equal pulses of the extremities, normal S 1and S 2   Gastrointestinal: Nondistended, soft, non-tender,  no  rebound tenderness or guarding, no masses palpable, no organomegaly, +BS, no bruits   Genitourinary: Carter   Extremities: multiple lacerations to bilateral knees  and legs, scrape noticed to left back   Neurological: alert and confused. Said a few words  during exam, follows commands.   Psychological: Appropriate mood and behavior     Medications     Medications:          Continuous Medications       --------------------------------  No continuous medications are active       Scheduled Medications       --------------------------------    1. Aspirin Enteric Coated:  81  mg  Oral  Daily    2. Carvedilol:  6.25  mg  Oral  2 Times a Day    3. Docusate:  100  mg  Oral  2 Times a Day    4. Famotidine:  20  mg  Oral  Daily    5. Heparin SubCutaneous:  5000  unit(s)  SubCutaneous  Every 8 Hours    6. Insulin Lispro Mild Corrective Scale:  unit(s)  SubCutaneous  4 Times a Day Insulin Timing    7. Rosuvastatin:  20  mg  Oral  Daily    8. Vancomycin - RPh to Dose - IV Piggy Back:  1  each  As Specified  Variable         PRN Medications       --------------------------------    1. Acetaminophen:  650  mg  Oral  Every 4 Hours    2. Acetaminophen:  650  mg  Oral  Every 4 Hours    3. Dextrose 50% in Water Injectable:  25  gram(s)  IntraVenous Push  Every 15 Minutes    4. Glucagon Injectable:  1  mg  IntraMuscular  Every 15 Minutes    5. Lidocaine 2% (UROJET) Topical Gel:  5  mL  Topical  Every 4 Hours    6. Ondansetron Injectable:  4  mg  IntraVenous Push  Every 4 Hours    7. Sodium Chloride 0.9% Injectable Flush:  10  mL  IntraVenous Flush  Every 8 Hours and as Needed           Currently Suspended Medications       --------------------------------    1. Losartan:  25  mg  Oral  Daily      Recent Lab Results     Results:        I have reviewed these laboratory results:    Troponin I, High Sensitivity  Trending View      Result 26-Sep-2023 12:34:00  26-Sep-2023 11:16:00    Troponin I, High Sensitivity 208   H   231       Lab  Comment:   TRP CALLED RB TO DESIRAE PADILLA , 09/26/2023 12:04        Complete Blood Count + Differential  26-Sep-2023 11:16:00      Result Value    White Blood Cell Count  7.8    Nucleated Erythrocyte Count  0.0    Red Blood Cell Count  3.21   L   HGB  10.7   L   HCT  31.9   L   MCV  99    MCHC  33.5    PLT  187    RDW-CV  14.4    Neutrophil %  54.9    Immature Granulocytes %  0.6    Lymphocyte %  32.1    Monocyte %  11.3    Eosinophil %  0.6    Basophil %  0.5    Neutrophil Count  4.28    Lymphocyte Count  2.51    Monocyte Count  0.88   H   Eosinophil Count  0.05    Basophil Count  0.04      Comprehensive Metabolic Panel  26-Sep-2023 11:16:00      Result Value    Glucose, Serum  135   H   NA  135   L   K  4.2    CL  103    Bicarbonate, Serum  24    Anion Gap, Serum  12    BUN  34   H   CREAT  2.43   H   GFR Male  25   A   Calcium, Serum  9.6    ALB  3.1   L   ALKP  62    T Pro  5.9   L   T Bili  0.6    Alanine Aminotransferase, Serum  25    Aspartate Transaminase, Serum  32      PT + INR, Plasma  26-Sep-2023 11:16:00      Result Value    Prothrombin Time, Plasma  13.2   H   International Normalized Ratio, Plasma  1.2   H     Coronavirus 2019 by PCR  26-Sep-2023 11:16:00      Result Value    Fluid Source  Nasal, Nasopharyngeal    Coronavirus 2019,PCR  NOT DETECTED  Reference Range: Not Detected .This test has received FDA Emergency Use Authorization (EUA) and has been verified by [facility]. This test is only authorized for the duration of time that circums      Urinalysis with Culture if Indicated  26-Sep-2023 11:16:00      Result Value    Color, Urine  YANIRA  Reference Range: STRAW,YELLOW    Appearance, Urine  HAZY    Specific Gravity, Urine  1.012    pH, Urine  5.0    Protein, Urine  100(2+)   A   Glucose, Urine  NEGATIVE    Blood, Urine  SMALL(1+)   A   Ketones, Urine  NEGATIVE    Bilirubin, Urine  NEGATIVE    Urobilinogen, Urine  <2.0    Nitrite, Urine  NEGATIVE     Leukocyte Esterase, Urine  MODERATE(2+)   A     Urinalysis, Microscopic  26-Sep-2023 11:16:00      Result Value    White Cells  >100   A   Red Blood Cells  5-20   A   Bacteria, Urine  4+   A     Lactate, Level  26-Sep-2023 11:16:00      Result Value    Lactate, Level  1.7      Activated Partial Thromboplastin Time  26-Sep-2023 11:16:00      Result Value    Activated Partial Thromboplastin Time  30        Radiology Results     Results:        Impression:    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 Otherwise,  negative exam.     Xray Chest 1 View [Sep 26 2023 11:33AM]      Assessment and Plan:   Assessment:    Impression:    Acute encephalopathy likely related to acute urinary tract infection  hx dementia, alert and oriented x2 at baseline  hx CKD 3  hx T2DM  hx hypertension  hx severe stenosis distal LAD  hx NSTEMI    Plan:    Admit with tele  fall precautions  cont Zosyn  cont vancomycin  blood cultures collected  Urine culture collected  MRSA swab  oxygen to maintain sp02 > 92%  CXR no acute findings  troponin 231, delta trop 208, third trop pending  RN bedside swallow evaluation  diabetic diet, insulin sliding scale  review of [facility]IE records patient renal function 8/17/23 Cr 2.46 BUN 49, GFR 29. also  3/2023 Creatine 1.84, BUN 27 GFR 35  Resume appropriate patient home medications  Time spent > 50 minutes  POC discussed with patient and attending        [id number] -- he has a signed dnrcca in his chart from the nursing home        Electronic Signatures for Addendum Section:   Gwen Polk () (Signed Addendum 26-Sep-2023 16:53)   Patient seen and examined at bedside.  Patient is alert and oriented x1 a little bit confused but is awake and alert and answers questions appropriately and  is a pleasantly confused.  He does have purulent urine in the Carter catheter which does have a foul smell.  Troponin is elevated at 231 and we will continue to trend.  Unclear creatinine baseline, will continue to  monitor.  We will continue IV antibiotics  and IV fluids.  Discussed with ASHLEY.     Electronic Signatures:  Ashlyn Short (APRN-CNP)  (Signed 26-Sep-2023 15:41)   Authored: History of Present Illness, Comorbidities,  Allergies, Medications Prior to Admission, Review of Systems, Objective, Assessment and Plan, Note Completion      Last Updated: 26-Sep-2023 16:53 by Gwen Polk (DO)

## 2023-09-30 NOTE — DISCHARGE SUMMARY
Send Summary:   Discharge Summary Providers:  Provider Role Provider Name   · Attending Pete Marshall   · Nurse  Practitioner Ashlyn Short   · Primary Unknown, Pcp       Note Recipients: Unknown, Pcp, MD       Discharge:    Summary:   Admission Date: .26-Sep-2023 10:39:00   Discharge Date: 29-Sep-2023   Attending Physician at Discharge: Dr. Marshall   Admission Reason: Increased confusion (1)   Final Discharge Diagnoses: UTI, MARIAH   Nutrition Diagnosis:      Agree with dietitian?s assessment and diagnoses as stated.  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  Procedures: none   Condition at Discharge: stable   Disposition at Discharge: SNF   Vital Signs:        T   P  R  BP   MAP  SpO2   Value  35.9  70  16  147/71      99%  Date/Time 9/29 8:00 9/29 8:00 9/29 8:00 9/29 8:00    9/29 8:00  Range  (35.9C - 37.2C )  (69 - 88 )  (16 - 18 )  (147 - 187 )/ (52 - 94 )    (95% - 99% )  Highest temp of 37.2 C was recorded at 9/28 20:00    Date:            Weight/Scale Type:  Height:   26-Sep-2023 15:17  90.9  kg / bed  185.3  cm  Physical Exam:      Constitutional: Well developed, awake/alert/confused, NAD, hx dementia  ENMT: mucous membranes moist, no apparent injury, no lesions seen  Respiratory/Thorax: Patent airways, CTAB, normal breath sounds with good chest expansion, thorax symmetric  Cardiovascular: Regular, rate and rhythm, no murmurs, 2+ equal pulses of the extremities, normal S 1and S 2  Gastrointestinal: Nondistended, soft, non-tender, no rebound tenderness or guarding, no masses palpable, no organomegaly, +BS, no bruits  Genitourinary: no Carter  Extremities: multiple lacerations to bilateral knees and legs, scrape noticed to left back  Neurological: alert and confused. more verbal than yesterday, follows  commands  Psychological: cooperative, follows commands     Hospital Course:    CHRISTINA THAYER is a 87 year old Male that presents to [facility] ER with chief complaint of altered mental status. His baseline is AO x2 and  he has a history of dementia but nursing staff found him to be confused today.  They also noticed that he had purulent urine output and is incontinent.  EMS found him to be hypotensive with systolic BP in the 80s.  Patient was seen on arrival to medical  floor. Patient's nurse reports urine very odorous and purulent in Carter. Patient is alert but confused and not good historian. Patient was started on IV Zosyn and Vancomycin in the ED.     Hospital course:    Patient reported with hx dementia, alert and oriented x2 at best. Mental status waxes and wanes. Appears patient selective at times to participate with exam/discussion, can voice no and yes to questions, at times can carry on conversation.   Urinary tract infection treated with IV antibiotics. Carter placed and removed. Urinalysis on 9/26 resulted e coli -pharmacy recommended ) Amoxicillin  875mg BID for total therapy for 5-7 days. 1/2 blood cultures +coag negative staph, likely contamination,  repeat blood cultures negative to date. MARIAH treated with IV fluids. Cr peaked and trending down, encourage fluids on DC. Seen by cardiology for elevated troponin-No ischemic EKG changes and no symptoms of angina current (agree patient cannot recall prior  episodes given dementia).        Discharge Information:    and Continuing Care:   Lab Results - Pending:    Culture, Blood Drawn at 28-Sep-2023 10:47:00  Culture, Blood Drawn at 28-Sep-2023 10:38:00  Culture, Blood Drawn at 26-Sep-2023 12:09:00  Culture, Blood Drawn at 26-Sep-2023 11:16:00  Radiology Results - Pending: None   Discharge Instructions:    Activity:           activity with assistance.    Nutrition/Diet:           resume normal diet    Additional Orders:            Additional Instructions:   Follow up with PCP within 1 week of discharge    Resume home medications  new medication at time of discharge  Amoxicillin 875 twice a day for 5 days end date oct 4, 2023    Rehab Services:           Occupational Therapy Orders:   Eval and Treat ([facility])   daily          Physical Therapy Orders:   Eval and Treat ([facility])   daily          Speech Therapy Orders:   Eval and Treat ([facility])   daily    Care Recommendation:           I recommend that INPATIENT care is required at::   Skilled          Estimated Stay:   > 180 days    Follow Up Appointments:    Follow-Up Appointment 01:           Physician/Dept/Service:   PCP          Reason for Referral:   follow up with patient's PCP          Call to Schedule in:   1 week    Discharge Medications: Home Medication   Aspirin Enteric Coated 81 mg oral delayed release tablet - 1 tab(s) orally once a day  carvedilol 6.25 mg oral tablet - 1 tab(s) orally 2 times a day  famotidine 20 mg oral tablet - 1 tab(s) orally 2 times a day  glimepiride 2 mg oral tablet - 1 tab(s) orally once a day  losartan 25 mg oral tablet - 1 tab(s) orally once a day  Melatonin 3 mg oral tablet - 1 tab(s) orally once a day (at bedtime)  metFORMIN 500 mg oral tablet - 1 tab(s) orally 2 times a day  potassium chloride 10 mEq oral tablet, extended release - 1 tab(s) orally once a day  rosuvastatin 20 mg oral tablet - 1 tab(s) orally once a day  amoxicillin 875 mg oral tablet - 1 tab(s) orally 2 times a day stop date oct 4     PRN Medication   Issues to Discuss at Follow-up / Goals for Continuing Care:    Activity:  activity with assistance.    Diet:  · Diet resume normal diet    Additional Orders:  · Additional Instructions   Follow up with PCP within 1 week of discharge    Resume home medications  new medication at time of discharge  Amoxicillin 875 twice a day for 5 days end date oct 4, 2023    DNR Status:   ·  Code  "Status Code Status order at time of discharge: DNR and No Intubation       Electronic Signatures:  Ashlyn Short ([facility]-CNP)  (Signed 29-Sep-2023 13:18)   Authored: Send Summary, Summary Content, Ongoing Care,  DNR Status, Note Completion      Last Updated: 29-Sep-2023 13:18 by Ashlyn Short (APRN-CNP)    References:  1.  Data Referenced From \"History and Physical\" 26-Sep-2023 13:23   "

## 2023-09-30 NOTE — PROGRESS NOTES
"      Service: General Internal Medicine     Subjective Data:   CHRISTINA THAYER is a 87 year old Male who is Hospital Day # 2.     Patient seen and examined  Nurse at bedside  Patient alert, answers very little questions, follows commands  when asked if he doesn't want to talk because he is tired, patient responds \"yes\"  Carter with clear yellow urine.    Objective Data:     Objective Information:      T   P  R  BP   MAP  SpO2   Value  36.6  52  18  123/59      96%  Date/Time 9/27 4:00 9/27 4:00 9/27 4:00 9/27 4:00    9/27 4:00  Range  (36C - 36.6C )  (52 - 116 )  (12 - 18 )  (110 - 168 )/ (52 - 73 )    (94% - 99% )      Pain reported at 9/26 20:00: 1  Pain reported at 9/26 20:00: unable to assess    Physical Exam by System     Constitutional: Well developed, awake/alert/confused,  NAD, hx dementia   ENMT: mucous membranes moist, no apparent injury,  no lesions seen   Respiratory/Thorax: Patent airways, CTAB, normal  breath sounds with good chest expansion, thorax symmetric   Cardiovascular: Regular, rate and rhythm, no murmurs,  2+ equal pulses of the extremities, normal S 1and S 2   Gastrointestinal: Nondistended, soft, non-tender,  no rebound tenderness or guarding, no masses palpable, no organomegaly, +BS, no bruits   Genitourinary: Carter-clear yellow   Extremities: multiple lacerations to bilateral knees  and legs, scrape noticed to left back   Neurological: alert and confused. Said a few words  during exam, follows commands.   Psychological: Appropriate mood and behavior     Medication     Medications:          Continuous Medications       --------------------------------    1. Sodium Chloride 0.9% Infusion:  1000  mL  IntraVenous  <Continuous>         Scheduled Medications       --------------------------------    1. Aspirin Enteric Coated:  81  mg  Oral  Daily    2. Carvedilol:  6.25  mg  Oral  2 Times a Day    3. Docusate:  100  mg  Oral  2 Times a Day    4. Famotidine:  20  mg  Oral  Daily    5. Heparin " SubCutaneous:  5000  unit(s)  SubCutaneous  Every 8 Hours    6. Insulin Lispro Mild Corrective Scale:  unit(s)  SubCutaneous  4 Times a Day Insulin Timing    7. Piperacillin - Tazobactam 3.375 gram/Iso-osmotic 50 mL Premix IVPB:  50  mL  IntraVenous Piggyback  Every 8 Hours    8. Rosuvastatin:  20  mg  Oral  Daily    9. Vancomycin - RPh to Dose - IV Piggy Back:  1  each  As Specified  Variable         PRN Medications       --------------------------------    1. Acetaminophen:  650  mg  Oral  Every 4 Hours    2. Acetaminophen:  650  mg  Oral  Every 4 Hours    3. Dextrose 50% in Water Injectable:  25  gram(s)  IntraVenous Push  Every 15 Minutes    4. Glucagon Injectable:  1  mg  IntraMuscular  Every 15 Minutes    5. Lidocaine 2% (UROJET) Topical Gel:  5  mL  Topical  Every 4 Hours    6. Ondansetron Injectable:  4  mg  IntraVenous Push  Every 4 Hours    7. Sodium Chloride 0.9% Injectable Flush:  10  mL  IntraVenous Flush  Every 8 Hours and as Needed           Currently Suspended Medications       --------------------------------    1. Losartan:  25  mg  Oral  Daily      Recent Lab Results     Results:        I have reviewed these laboratory results:    Troponin I, High Sensitivity  Trending View      Result 26-Sep-2023 12:34:00  26-Sep-2023 11:16:00    Troponin I, High Sensitivity 208   H   231   HH    Lab Comment:   [facility] CALLED RB TO DESIRAE PADILLA , 09/26/2023 12:04        Complete Blood Count + Differential  26-Sep-2023 11:16:00      Result Value    White Blood Cell Count  7.8    Nucleated Erythrocyte Count  0.0    Red Blood Cell Count  3.21   L   HGB  10.7   L   HCT  31.9   L   MCV  99    MCHC  33.5    PLT  187    RDW-CV  14.4    Neutrophil %  54.9    Immature Granulocytes %  0.6    Lymphocyte %  32.1    Monocyte %  11.3    Eosinophil %  0.6    Basophil %  0.5    Neutrophil Count  4.28    Lymphocyte Count  2.51    Monocyte Count  0.88   H   Eosinophil Count  0.05    Basophil Count  0.04      Comprehensive  Metabolic Panel  26-Sep-2023 11:16:00      Result Value    Glucose, Serum  135   H   NA  135   L   K  4.2    CL  103    Bicarbonate, Serum  24    Anion Gap, Serum  12    BUN  34   H   CREAT  2.43   H   GFR Male  25   A   Calcium, Serum  9.6    ALB  3.1   L   ALKP  62    T Pro  5.9   L   T Bili  0.6    Alanine Aminotransferase, Serum  25    Aspartate Transaminase, Serum  32      PT + INR, Plasma  26-Sep-2023 11:16:00      Result Value    Prothrombin Time, Plasma  13.2   H   International Normalized Ratio, Plasma  1.2   H     Coronavirus 2019 by PCR  26-Sep-2023 11:16:00      Result Value    Fluid Source  Nasal, Nasopharyngeal    Coronavirus 2019,PCR  NOT DETECTED  Reference Range: Not Detected .This test has received FDA Emergency Use Authorization (EUA) and has been verified by [facility]. This test is only authorized for the duration of time that circums      Urinalysis with Culture if Indicated  26-Sep-2023 11:16:00      Result Value    Color, Urine  YANIRA  Reference Range: STRAW,YELLOW    Appearance, Urine  HAZY    Specific Gravity, Urine  1.012    pH, Urine  5.0    Protein, Urine  100(2+)   A   Glucose, Urine  NEGATIVE    Blood, Urine  SMALL(1+)   A   Ketones, Urine  NEGATIVE    Bilirubin, Urine  NEGATIVE    Urobilinogen, Urine  <2.0    Nitrite, Urine  NEGATIVE    Leukocyte Esterase, Urine  MODERATE(2+)   A     Urinalysis, Microscopic  26-Sep-2023 11:16:00      Result Value    White Cells  >100   A   Red Blood Cells  5-20   A   Bacteria, Urine  4+   A     Lactate, Level  26-Sep-2023 11:16:00      Result Value    Lactate, Level  1.7      Activated Partial Thromboplastin Time  26-Sep-2023 11:16:00      Result Value    Activated Partial Thromboplastin Time  30        Radiology Results     Results:        Impression:    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 Otherwise,  negative exam.     Xray Chest 1 View [Sep 26 2023 11:33AM]      Assessment and Plan:   Daily Risk  Screen   ·  Does patient have an indwelling urinary catheter? yes   ·  Plan for indwelling urinary catheter removal today? no   ·  The patient continues to require indwelling urinary catheterization for urinary retention/bladder outlet obstruction, acute or chronic     Code Status   ·  Code Status DNAR with Added Limitations     Advance Care Planning   Advance Care Planning: Patient didn't wish to or  was unable to provide advance care plan     Assessment:      Impression:    Acute encephalopathy likely related to acute urinary tract infection  hx dementia, alert and oriented x2 at baseline  hx CKD 3  hx T2DM  hx hypertension  hx severe stenosis distal LAD  hx NSTEMI    Plan:    cont tele  fall precautions  cont Zosyn  cont vancomycin  blood cultures collected, negative to date  Urine culture collected, follow culture  MRSA swab ordered  oxygen to maintain sp02 > 92%, remains on room air  Cardiology consulted for elevated troponin-signed off.   diabetic diet, insulin sliding scale  review of HIE records patient renal function 8/17/23 Cr 2.46 BUN 49, GFR 29. also  3/2023 Creatine 1.84, BUN 27 GFR 35  renal function has improved since yesterday Cr 2.42 today 1.83  cont IV fluids, change from NS to LR at 75 ml hour  Resume appropriate patient home medications  am labs including cbc, bmp, mag  pt ot consulted  time spent > 30 minutes  POC discussed with patient's RN and attending  Dispo: Likely will be ready for dc tomorrow if urine culture is obtained      [address] -- he has a signed dnrcca in his chart from the nursing home      Nutrition Diagnosis   ·  Nutrition Diagnosis      Agree with dietitian?s assessment and diagnoses as stated.  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      Electronic Signatures  for Addendum Section:   Gwen Polk () (Signed Addendum 27-Sep-2023 18:51)   I did not see patient at bedside, but did chart review and staff patient with ASHLEY who saw and examined patient.  Culture growing enteric bacilli.  Blood cultures  no growth to date.  We will continue IV antibiotics.  PT OT.  Discussed with ASHLEY.     Electronic Signatures:  Gwen Polk)  (Signature Pending)   Authored: Assessment and Plan  Ashlyn Short (APRN-CNP)  (Signed 27-Sep-2023 11:37)   Authored: Service, Subjective Data, Objective Data, Assessment  and Plan, Note Completion      Last Updated: 27-Sep-2023 18:51 by Gwen Polk ()

## 2023-10-01 LAB
BLOOD CULTURE: ABNORMAL
BLOOD CULTURE: ABNORMAL

## 2023-10-02 LAB
ATRIAL RATE: 103 BPM
BLOOD CULTURE: NORMAL
BLOOD CULTURE: NORMAL
P AXIS: 46 DEGREES
P OFFSET: 171 MS
P ONSET: 119 MS
PR INTERVAL: 202 MS
Q ONSET: 220 MS
QRS COUNT: 17 BEATS
QRS DURATION: 150 MS
QT INTERVAL: 376 MS
QTC CALCULATION(BAZETT): 492 MS
QTC FREDERICIA: 450 MS
R AXIS: 99 DEGREES
T AXIS: -3 DEGREES
T OFFSET: 408 MS
VENTRICULAR RATE: 103 BPM

## 2023-10-03 ENCOUNTER — NURSING HOME VISIT (OUTPATIENT)
Dept: POST ACUTE CARE | Facility: EXTERNAL LOCATION | Age: 88
End: 2023-10-03
Payer: MEDICARE

## 2023-10-03 DIAGNOSIS — F02.B0 MODERATE LATE ONSET ALZHEIMER'S DEMENTIA WITHOUT BEHAVIORAL DISTURBANCE, PSYCHOTIC DISTURBANCE, MOOD DISTURBANCE, OR ANXIETY (MULTI): ICD-10-CM

## 2023-10-03 DIAGNOSIS — E11.22 TYPE 2 DIABETES MELLITUS WITH STAGE 2 CHRONIC KIDNEY DISEASE, WITHOUT LONG-TERM CURRENT USE OF INSULIN (MULTI): Primary | ICD-10-CM

## 2023-10-03 DIAGNOSIS — G30.1 MODERATE LATE ONSET ALZHEIMER'S DEMENTIA WITHOUT BEHAVIORAL DISTURBANCE, PSYCHOTIC DISTURBANCE, MOOD DISTURBANCE, OR ANXIETY (MULTI): ICD-10-CM

## 2023-10-03 DIAGNOSIS — R62.7 ADULT FAILURE TO THRIVE: ICD-10-CM

## 2023-10-03 DIAGNOSIS — I10 PRIMARY HYPERTENSION: ICD-10-CM

## 2023-10-03 DIAGNOSIS — N18.2 TYPE 2 DIABETES MELLITUS WITH STAGE 2 CHRONIC KIDNEY DISEASE, WITHOUT LONG-TERM CURRENT USE OF INSULIN (MULTI): Primary | ICD-10-CM

## 2023-10-03 DIAGNOSIS — R63.0 LOSS OF APPETITE: ICD-10-CM

## 2023-10-03 PROCEDURE — 99348 HOME/RES VST EST LOW MDM 30: CPT | Performed by: INTERNAL MEDICINE

## 2023-10-03 NOTE — LETTER
Patient: Alessandra Dong  : 1935    Encounter Date: 10/03/2023    Follow-up visit  Staff reports that the patient continues to decline and is weaker.  Appetite is poor.  He is requiring 2 person assist and is difficult to manage here in the assisted living type atmosphere.  There has been requested transfer to the SNF unit.  Family is in agreement.    Medications and orders reviewed.    Review of systems  Patient is a very poor historian and but does not verbalize any specific complaints when asked.  He specifically denies pain shortness of breath or GI problems or abdominal pain    Physical exam most recent blood pressures demonstrate a blood pressure of 102/75, temp 98 4, pulse 80, pulse ox is 96% on room air  The patient is sitting in his chair does not appear to be in any acute distress.  He appears pale and weak.  Lungs are clear.  Heart rate rhythm is regular.  Abdomen is soft nontender.  There is no peripheral edema.  Neurologically there are no focal deficits or tremors.  He has movement in all extremities but just appears weak    Assessment and care plan  Developing adult failure to thrive likely multifactorial.  He has advanced dementia.  We will transfer the patient to the SNF unit and commence further work-up with labs urinalysis and review of medication use.  We will list PT and OT as well to see if we can generate further strength and activity        Electronically Signed By: Tanmay Burgos DO   10/3/23 10:24 AM

## 2023-10-04 ENCOUNTER — NURSING HOME VISIT (OUTPATIENT)
Dept: POST ACUTE CARE | Facility: EXTERNAL LOCATION | Age: 88
End: 2023-10-04
Payer: MEDICARE

## 2023-10-04 DIAGNOSIS — G30.1 MODERATE LATE ONSET ALZHEIMER'S DEMENTIA WITHOUT BEHAVIORAL DISTURBANCE, PSYCHOTIC DISTURBANCE, MOOD DISTURBANCE, OR ANXIETY (MULTI): ICD-10-CM

## 2023-10-04 DIAGNOSIS — I10 PRIMARY HYPERTENSION: ICD-10-CM

## 2023-10-04 DIAGNOSIS — E11.22 TYPE 2 DIABETES MELLITUS WITH STAGE 2 CHRONIC KIDNEY DISEASE, WITHOUT LONG-TERM CURRENT USE OF INSULIN (MULTI): Primary | ICD-10-CM

## 2023-10-04 DIAGNOSIS — E46 PROTEIN-CALORIE MALNUTRITION, UNSPECIFIED SEVERITY (MULTI): ICD-10-CM

## 2023-10-04 DIAGNOSIS — R62.7 ADULT FAILURE TO THRIVE: ICD-10-CM

## 2023-10-04 DIAGNOSIS — N18.2 TYPE 2 DIABETES MELLITUS WITH STAGE 2 CHRONIC KIDNEY DISEASE, WITHOUT LONG-TERM CURRENT USE OF INSULIN (MULTI): Primary | ICD-10-CM

## 2023-10-04 DIAGNOSIS — F02.B0 MODERATE LATE ONSET ALZHEIMER'S DEMENTIA WITHOUT BEHAVIORAL DISTURBANCE, PSYCHOTIC DISTURBANCE, MOOD DISTURBANCE, OR ANXIETY (MULTI): ICD-10-CM

## 2023-10-04 LAB
ANION GAP IN SER/PLAS: NORMAL
CALCIUM (MG/DL) IN SER/PLAS: NORMAL
CARBON DIOXIDE, TOTAL (MMOL/L) IN SER/PLAS: NORMAL
CHLORIDE (MMOL/L) IN SER/PLAS: NORMAL
CREATININE (MG/DL) IN SER/PLAS: NORMAL
GFR FEMALE: NORMAL
GFR MALE: NORMAL
GLOMERULAR FILTRATION RATE-AFRICAN AMERICAN: NORMAL ML/MIN/1.73M2
GLOMERULAR FILTRATION RATE-NON AFRICAN AMERICAN: NORMAL ML/MIN/1.73M2
GLUCOSE (MG/DL) IN SER/PLAS: NORMAL
MAGNESIUM (MG/DL) IN SER/PLAS: NORMAL
POTASSIUM (MMOL/L) IN SER/PLAS: NORMAL
SODIUM (MMOL/L) IN SER/PLAS: NORMAL
UREA NITROGEN (MG/DL) IN SER/PLAS: NORMAL

## 2023-10-04 PROCEDURE — 99305 1ST NF CARE MODERATE MDM 35: CPT | Performed by: INTERNAL MEDICINE

## 2023-10-04 NOTE — LETTER
Patient: Alessandra Dong  : 1935    Encounter Date: 10/04/2023    Admission note  7-year-old male who resides at memory care unit for advanced dementia.  He is continued to decline in the last few days with poor oral intake and increasing weakness and was subsequently transferred to this skilled nursing facility for further monitoring and support and work-up.    Medical history is remarkable for dementia, adult failure to thrive, loss of appetite, prostate cancer, prior kidney cancer, and type 2 diabetes mellitus.    Medications and orders are reviewed.  Clinical notes are reviewed.    Review of systems  Patient is a poor historian.  He denies any pain or discomfort.  There is been no report by staff of fever chills shortness of breath, there is been no report by staff of GI issues nausea vomiting or diarrhea    Physical exam  Blood pressure is 158/87, temp 98.4, pulse is 50, pulse ox is 95% on room air  Patient does not appear to be in any acute distress.  HEENT demonstrates the patient to be mildly pale but otherwise unremarkable.  He is alert pleasant but not oriented  Lungs are clear.  Heart rate rhythm is regular.  Abdomen is soft nontender.  No is no significant peripheral edema.  Neurologically demonstrates no focal deficits or tremors.  There is generalized weakness.    Assessment and care plan  Advanced dementia with adult failure to thrive and evidence of protein calorie malnutrition.  He is also diabetic.  Sugars have been low normal.    We will obtain baseline labs including CBC CMP thyroid profile, we will monitor blood glucose.  In the meantime we will discontinue his glimepiride and continue his metformin and monitor his Accu-Cheks.      Electronically Signed By: Tanmay Burgos DO   10/4/23 11:04 AM

## 2023-10-04 NOTE — PROGRESS NOTES
Admission note  7-year-old male who resides at memory care unit for advanced dementia.  He is continued to decline in the last few days with poor oral intake and increasing weakness and was subsequently transferred to this skilled nursing facility for further monitoring and support and work-up.    Medical history is remarkable for dementia, adult failure to thrive, loss of appetite, prostate cancer, prior kidney cancer, and type 2 diabetes mellitus.    Medications and orders are reviewed.  Clinical notes are reviewed.    Review of systems  Patient is a poor historian.  He denies any pain or discomfort.  There is been no report by staff of fever chills shortness of breath, there is been no report by staff of GI issues nausea vomiting or diarrhea    Physical exam  Blood pressure is 158/87, temp 98.4, pulse is 50, pulse ox is 95% on room air  Patient does not appear to be in any acute distress.  HEENT demonstrates the patient to be mildly pale but otherwise unremarkable.  He is alert pleasant but not oriented  Lungs are clear.  Heart rate rhythm is regular.  Abdomen is soft nontender.  No is no significant peripheral edema.  Neurologically demonstrates no focal deficits or tremors.  There is generalized weakness.    Assessment and care plan  Advanced dementia with adult failure to thrive and evidence of protein calorie malnutrition.  He is also diabetic.  Sugars have been low normal.    We will obtain baseline labs including CBC CMP thyroid profile, we will monitor blood glucose.  In the meantime we will discontinue his glimepiride and continue his metformin and monitor his Accu-Cheks.

## 2023-10-12 NOTE — CONSULTS
Service:   Service: Cardiology     Consult:  Consult requested by (Attending Name): Gwen Polk   Reason: elevated troponin, hx NSTEMI, hx severe stenosis  distal LAD, dementia alert x2 baseline     History of Present Illness:   HPI:    CHRISTINA THAYER is a 87 year old Male    Asked to see patient about elevated troponins and history of CAD. Patient is a poor historian and was transferred from his Memory care unit to [facility] for altered mental status, hypotension, and pyuria.     He has normally received care at [facility] and with [facility] physicians predominantly. His care was recently assumed by  physicians at his SNF as he was no longer able to easily see his outpatient physicians. Per one of their notes he has seen Dr. Faustin in the past. Patient agrees that he sees Dr. Faustin but cannot recall his last appointment and that he no longer has any heart problems. A note refers to a high grade distal LAD stenosis - I cannot find any such information in [facility] [place].     Patient denies chest discomfort, shortness of breath, palpitations, dizziness or lightheadedness at this time.     PMH:  Advanced dementia  CAD - as described above - cannot confirm details, hx of NONSTEMI  CKD3  Prostate cancer  Renal cell carcinoma s/p left nephrectomy  NEETA  Sacral decubitus  Normal pressure hydrocephalus  Spinal stenosis of the cervical spine  HTN  Type2 DM  UTI - indwelling Carter catheter    PSH: reviewed  SH: nonsmoker, born in [facility] [place], moved here as a child with his parents. Cannot list his occupation prior to senior living.   FH: unable to obtain    Review Family/Social History and ROS:     Review Family/Social History and ROS:       I have reviewed the family and social history and review of systems from the History and Physical.    Social History:    Smoking Status: never smoker   Alcohol Use: denies, unknown   Drug Use: unknown  (1)     Incomplete ROS: patient's impaired mental status     Constitutional: NEGATIVE:  Fever, Chills     Eyes: NEGATIVE: Blurry Vision     Respiratory: NEGATIVE: Shortness of Breath     Cardiac: NEGATIVE: Chest Pain, Dyspnea on Exertion,  Palpitations, Syncope     Gastrointestinal: NEGATIVE: Nausea, Vomiting, Abdominal  Pain     Genitourinary: NEGATIVE: Hematuria     Musculoskeletal: POSITIVE: Weakness     Neurological: NEGATIVE: Dizziness, Headache     Endocrine: POSITIVE: Cold Intolerance     Breast: NEGATIVE: Pain     All Other Systems: All other systems reviewed and  are negative            Allergies:  ·  No Known Allergies :     Objective:     Objective Information:        T   P  R  BP   MAP  SpO2   Value  36  84  18  122/59      99%  Date/Time 9/26 14:04 9/26 14:04 9/26 14:04 9/26 14:04    9/26 14:04  Range  (36C - 36.5C )  (84 - 116 )  (12 - 18 )  (110 - 150 )/ (52 - 68 )    (94% - 99% )         Weights   9/26 15:17: Weight in kg (Weight (kg))  90.9  9/26 15:17: Weight in lbs ((lbs))  200.4  9/26 15:17: BMI (kg/m2) (BMI (kg/m2))  26.473    Physical Exam by System:    Constitutional: Elderly man in NAD, laying on side   Eyes: PERRL, EOMI, clear sclera   ENMT: dry membranes   Head/Neck: no JVD, no bruits   Respiratory/Thorax: Patent airways, CTAB, normal  breath sounds with good chest expansion, thorax symmetric   Cardiovascular: PMI unable to be palpated  RR, normal S1 and S2, no S3. Soft SHELDON RUSB  trace pedal edema, normal distal perfusion  EKG - ST, RBBB, diffuse NS ST and T abn, no evidence of Q wave infarction   Gastrointestinal: Soft and nontender   Genitourinary: hernández draining cloudy urine   Musculoskeletal: OA changes   Extremities: trace pedal edema, normal distal perfusion,  no clubbing or cyanosis   Neurological: alert, oriented to person only, no  focal deficits   Breast: no pain or tenderness   Lymphatic: No significant palpable lymphadenopathy   Psychological: confused and disoriented   Skin: No rash - sacral area covered with dressing     Medications:    Medications:           Continuous Medications       --------------------------------  No continuous medications are active       Scheduled Medications       --------------------------------    1. Aspirin Enteric Coated:  81  mg  Oral  Daily    2. Carvedilol:  6.25  mg  Oral  2 Times a Day    3. Docusate:  100  mg  Oral  2 Times a Day    4. Famotidine:  20  mg  Oral  Daily    5. Heparin SubCutaneous:  5000  unit(s)  SubCutaneous  Every 8 Hours    6. Insulin Lispro Mild Corrective Scale:  unit(s)  SubCutaneous  4 Times a Day Insulin Timing    7. Rosuvastatin:  20  mg  Oral  Daily    8. Vancomycin - RPh to Dose - IV Piggy Back:  1  each  As Specified  Variable         PRN Medications       --------------------------------    1. Acetaminophen:  650  mg  Oral  Every 4 Hours    2. Acetaminophen:  650  mg  Oral  Every 4 Hours    3. Dextrose 50% in Water Injectable:  25  gram(s)  IntraVenous Push  Every 15 Minutes    4. Glucagon Injectable:  1  mg  IntraMuscular  Every 15 Minutes    5. Lidocaine 2% (UROJET) Topical Gel:  5  mL  Topical  Every 4 Hours    6. Ondansetron Injectable:  4  mg  IntraVenous Push  Every 4 Hours    7. Sodium Chloride 0.9% Injectable Flush:  10  mL  IntraVenous Flush  Every 8 Hours and as Needed           Currently Suspended Medications       --------------------------------    1. Losartan:  25  mg  Oral  Daily      Recent Lab Results:    Results:        I have reviewed these laboratory results:    Troponin I, High Sensitivity  Trending View      Result 26-Sep-2023 12:34:00  26-Sep-2023 11:16:00    Troponin I, High Sensitivity 208   H   231   HH    Lab Comment:   [facility] CALLED RB TO DESIRAE PADILLA , 09/26/2023 12:04        Complete Blood Count + Differential  26-Sep-2023 11:16:00      Result Value    White Blood Cell Count  7.8    Nucleated Erythrocyte Count  0.0    Red Blood Cell Count  3.21   L   HGB  10.7   L   HCT  31.9   L   MCV  99    MCHC  33.5    PLT  187    RDW-CV  14.4    Neutrophil %  54.9    Immature  Granulocytes %  0.6    Lymphocyte %  32.1    Monocyte %  11.3    Eosinophil %  0.6    Basophil %  0.5    Neutrophil Count  4.28    Lymphocyte Count  2.51    Monocyte Count  0.88   H   Eosinophil Count  0.05    Basophil Count  0.04      Comprehensive Metabolic Panel  26-Sep-2023 11:16:00      Result Value    Glucose, Serum  135   H   NA  135   L   K  4.2    CL  103    Bicarbonate, Serum  24    Anion Gap, Serum  12    BUN  34   H   CREAT  2.43   H   GFR Male  25   A   Calcium, Serum  9.6    ALB  3.1   L   ALKP  62    T Pro  5.9   L   T Bili  0.6    Alanine Aminotransferase, Serum  25    Aspartate Transaminase, Serum  32      PT + INR, Plasma  26-Sep-2023 11:16:00      Result Value    Prothrombin Time, Plasma  13.2   H   International Normalized Ratio, Plasma  1.2   H     Urinalysis with Culture if Indicated  26-Sep-2023 11:16:00      Result Value    Color, Urine  YANIRA  Reference Range: STRAW,YELLOW    Appearance, Urine  HAZY    Specific Gravity, Urine  1.012    pH, Urine  5.0    Protein, Urine  100(2+)   A   Glucose, Urine  NEGATIVE    Blood, Urine  SMALL(1+)   A   Ketones, Urine  NEGATIVE    Bilirubin, Urine  NEGATIVE    Urobilinogen, Urine  <2.0    Nitrite, Urine  NEGATIVE    Leukocyte Esterase, Urine  MODERATE(2+)   A     Lactate, Level  26-Sep-2023 11:16:00      Result Value    Lactate, Level  1.7      Activated Partial Thromboplastin Time  26-Sep-2023 11:16:00      Result Value    Activated Partial Thromboplastin Time  30        Radiology Results:    Results:        Impression:    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 Otherwise,  negative exam.     Xray Chest 1 View [Sep 26 2023 11:33AM]        Assessment:    1. Elevated troponin. 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 No ischemic EKG  changes and no symptoms of angina current (agree patient cannot recall prior episodes given dementia).     Given his dementia and  "poor functional capacity. Additional cardiovascular testing is not appropriate or warranted. If patient HAD angina would recommend conservative and symptom driven medical therapy.     2. Abnormal EKG / RBBB. No old EKG for comparison.     3. Hx of CAD and distal LAD stenosis. Do not doubt but cannot confirm from [facility]. Please see recommendations from #1 above.     4. Hypotension - patient has had low blood pressure readings over the past month at his SNF and they have been decreasing his medications accordingly. Current hypotension may be this, combination of dehydration from poor oral intake, +/- sepsis. Would  hold his meds and restart at the appropriate time. Would simplify medical regimen while cutting back on his medications.     No further testing planned or recommended. Cardiology will follow as needed - call if any specific questions.     Katherin Osman MD    Consult Status:  Consult Status    (select all that apply): initial  consult complete, will not follow, call as needed   Consult Order ID: [id number]       Electronic Signatures:  Katherin Osman)  (Signed 26-Sep-2023 16:01)   Authored: Service, History of Present Illness, Review  Family/Social History and ROS, Allergies, Objective, Assessment/Recommendations, Note Completion      Last Updated: 26-Sep-2023 16:01 by Katherin Osman)    References:  1.  Data Referenced From \"Patient Profile - Adult v2\" 26-Sep-2023 15:17   "

## 2023-11-06 ENCOUNTER — NURSING HOME VISIT (OUTPATIENT)
Dept: POST ACUTE CARE | Facility: EXTERNAL LOCATION | Age: 88
End: 2023-11-06
Payer: MEDICARE

## 2023-11-06 DIAGNOSIS — E11.22 TYPE 2 DIABETES MELLITUS WITH STAGE 2 CHRONIC KIDNEY DISEASE, WITHOUT LONG-TERM CURRENT USE OF INSULIN (MULTI): Primary | ICD-10-CM

## 2023-11-06 DIAGNOSIS — N18.2 TYPE 2 DIABETES MELLITUS WITH STAGE 2 CHRONIC KIDNEY DISEASE, WITHOUT LONG-TERM CURRENT USE OF INSULIN (MULTI): Primary | ICD-10-CM

## 2023-11-06 DIAGNOSIS — F02.B0 MODERATE LATE ONSET ALZHEIMER'S DEMENTIA WITHOUT BEHAVIORAL DISTURBANCE, PSYCHOTIC DISTURBANCE, MOOD DISTURBANCE, OR ANXIETY (MULTI): ICD-10-CM

## 2023-11-06 DIAGNOSIS — I10 PRIMARY HYPERTENSION: ICD-10-CM

## 2023-11-06 DIAGNOSIS — R62.7 ADULT FAILURE TO THRIVE: ICD-10-CM

## 2023-11-06 DIAGNOSIS — G30.1 MODERATE LATE ONSET ALZHEIMER'S DEMENTIA WITHOUT BEHAVIORAL DISTURBANCE, PSYCHOTIC DISTURBANCE, MOOD DISTURBANCE, OR ANXIETY (MULTI): ICD-10-CM

## 2023-11-06 PROCEDURE — 99349 HOME/RES VST EST MOD MDM 40: CPT | Performed by: INTERNAL MEDICINE

## 2023-11-06 NOTE — LETTER
Patient: Alessandra Dong  : 1935    Encounter Date: 2023    Follow-up visit  Patient has been transferred back from SNF.  Given his overall decline advanced dementia and clinical presentation of adult failure to thrive the patient had been placed on hospice.  Currently there are no behavioral issues.  Per staff report that at times he refuses medication but is in general has declined this week poorly responsive and appears quite sedated at times.    Medications and orders reviewed.    Physical exam  Blood pressure is 146/74, temp 97 9, pulse 82, pulse ox is 95%.  Glucose trends appear quite acceptable.    The patient appears to be lethargic and sedated sitting in his chair listing forward and drooling.  There are no tremors or focal deficits.  Lungs are clear heart rate and rhythm is regular.    Assessment and care plan  Advanced dementia.  No significant behavioral issues currently but the patient appears lethargic and sedated so we will decrease his Seroquel to 25 mg at bedtime only and further adjust or decrease as necessary to optimize awareness and activity.    His diabetes appears quite well controlled and we will cut his Accu-Cheks to once weekly.    Given the above    We will also discontinue the rosuvastatin      Electronically Signed By: Tanmay Burgos DO   23 11:42 AM

## 2023-11-06 NOTE — PROGRESS NOTES
Follow-up visit  Patient has been transferred back from SNF.  Given his overall decline advanced dementia and clinical presentation of adult failure to thrive the patient had been placed on hospice.  Currently there are no behavioral issues.  Per staff report that at times he refuses medication but is in general has declined this week poorly responsive and appears quite sedated at times.    Medications and orders reviewed.    Physical exam  Blood pressure is 146/74, temp 97 9, pulse 82, pulse ox is 95%.  Glucose trends appear quite acceptable.    The patient appears to be lethargic and sedated sitting in his chair listing forward and drooling.  There are no tremors or focal deficits.  Lungs are clear heart rate and rhythm is regular.    Assessment and care plan  Advanced dementia.  No significant behavioral issues currently but the patient appears lethargic and sedated so we will decrease his Seroquel to 25 mg at bedtime only and further adjust or decrease as necessary to optimize awareness and activity.    His diabetes appears quite well controlled and we will cut his Accu-Cheks to once weekly.    Given the above    We will also discontinue the rosuvastatin    Patient also has a history of hypertension which appears to need a little better control given recent blood pressure measurements.  We will increase his carvedilol to 12.5 mg twice a day

## 2023-11-08 ENCOUNTER — NURSING HOME VISIT (OUTPATIENT)
Dept: POST ACUTE CARE | Facility: EXTERNAL LOCATION | Age: 88
End: 2023-11-08
Payer: MEDICARE

## 2023-11-08 DIAGNOSIS — F02.B0 MODERATE LATE ONSET ALZHEIMER'S DEMENTIA WITHOUT BEHAVIORAL DISTURBANCE, PSYCHOTIC DISTURBANCE, MOOD DISTURBANCE, OR ANXIETY (MULTI): ICD-10-CM

## 2023-11-08 DIAGNOSIS — R63.0 LOSS OF APPETITE: ICD-10-CM

## 2023-11-08 DIAGNOSIS — G30.1 MODERATE LATE ONSET ALZHEIMER'S DEMENTIA WITHOUT BEHAVIORAL DISTURBANCE, PSYCHOTIC DISTURBANCE, MOOD DISTURBANCE, OR ANXIETY (MULTI): ICD-10-CM

## 2023-11-08 DIAGNOSIS — E11.22 TYPE 2 DIABETES MELLITUS WITH STAGE 2 CHRONIC KIDNEY DISEASE, WITHOUT LONG-TERM CURRENT USE OF INSULIN (MULTI): Primary | ICD-10-CM

## 2023-11-08 DIAGNOSIS — N18.2 TYPE 2 DIABETES MELLITUS WITH STAGE 2 CHRONIC KIDNEY DISEASE, WITHOUT LONG-TERM CURRENT USE OF INSULIN (MULTI): Primary | ICD-10-CM

## 2023-11-08 DIAGNOSIS — Z51.5 HOSPICE CARE PATIENT: ICD-10-CM

## 2023-11-08 DIAGNOSIS — R62.7 ADULT FAILURE TO THRIVE: ICD-10-CM

## 2023-11-08 PROCEDURE — 99349 HOME/RES VST EST MOD MDM 40: CPT | Performed by: INTERNAL MEDICINE

## 2023-11-08 NOTE — LETTER
Patient: Alessandra Dong  : 1935    Encounter Date: 2023    Follow-up visit  Asked to see the patient because of continued decline and lethargy.  Poor appetite refusing or unable to take medications in the last couple of days.    Medications and orders reviewed.    Review of systems  The patient denies pain.  He denies shortness of breath or any GI distress.  No other issues reported by staff except for weakness and lethargy.  Patient was seen by hospice yesterday and placed in bed for comfort where he is remained.    On physical exam  Currently vital signs appear stable he is afebrile.  He is in bed and appears weak somewhat lethargic but alert and does respond with short responses.  He appears somewhat pale but no significant distress.  Lungs are clear.  Heart rate rhythm is regular.  Abdomen is soft nontender.    Assessment and care plan  Clinical presentation is that of adult failure to thrive secondary to multiple medical problems including advanced dementia.  We will discontinue nonessential medications particularly since there is been no behavioral issues and the patient is somewhat lethargic.  We will discontinue Seroquel and melatonin altogether.  We will decrease his metformin to 500 mg once daily.  This may need to be discontinued altogether if his lack of appetite and oral intake decreases.  We will monitor his glucose with Accu-Cheks few times a week to help monitor this.    The patient is on hospice so no other further work-up will be done at this time.  We will provide morphine sulfate solution 20 mg/mL to give 10 mg or 0.5 mL's every 2 hours as needed for restlessness or discomfort should the need this in the near future      Electronically Signed By: Tanmay Burgos DO   23 12:40 PM

## 2023-11-08 NOTE — PROGRESS NOTES
Follow-up visit  Asked to see the patient because of continued decline and lethargy.  Poor appetite refusing or unable to take medications in the last couple of days.    Medications and orders reviewed.    Review of systems  The patient denies pain.  He denies shortness of breath or any GI distress.  No other issues reported by staff except for weakness and lethargy.  Patient was seen by hospice yesterday and placed in bed for comfort where he is remained.    On physical exam  Currently vital signs appear stable he is afebrile.  He is in bed and appears weak somewhat lethargic but alert and does respond with short responses.  He appears somewhat pale but no significant distress.  Lungs are clear.  Heart rate rhythm is regular.  Abdomen is soft nontender.    Assessment and care plan  Clinical presentation is that of adult failure to thrive secondary to multiple medical problems including advanced dementia.  We will discontinue nonessential medications particularly since there is been no behavioral issues and the patient is somewhat lethargic.  We will discontinue Seroquel and melatonin altogether.  We will decrease his metformin to 500 mg once daily.  This may need to be discontinued altogether if his lack of appetite and oral intake decreases.  We will monitor his glucose with Accu-Cheks few times a week to help monitor this.    The patient is on hospice so no other further work-up will be done at this time.  We will provide morphine sulfate solution 20 mg/mL to give 10 mg or 0.5 mL's every 2 hours as needed for restlessness or discomfort should the need this in the near future

## 2023-11-13 ENCOUNTER — NURSING HOME VISIT (OUTPATIENT)
Dept: POST ACUTE CARE | Facility: EXTERNAL LOCATION | Age: 88
End: 2023-11-13
Payer: MEDICARE

## 2023-11-13 DIAGNOSIS — F02.B0 MODERATE LATE ONSET ALZHEIMER'S DEMENTIA WITHOUT BEHAVIORAL DISTURBANCE, PSYCHOTIC DISTURBANCE, MOOD DISTURBANCE, OR ANXIETY (MULTI): ICD-10-CM

## 2023-11-13 DIAGNOSIS — G30.1 MODERATE LATE ONSET ALZHEIMER'S DEMENTIA WITHOUT BEHAVIORAL DISTURBANCE, PSYCHOTIC DISTURBANCE, MOOD DISTURBANCE, OR ANXIETY (MULTI): ICD-10-CM

## 2023-11-13 DIAGNOSIS — R62.7 ADULT FAILURE TO THRIVE: ICD-10-CM

## 2023-11-13 DIAGNOSIS — R63.0 LOSS OF APPETITE: ICD-10-CM

## 2023-11-13 DIAGNOSIS — E11.22 TYPE 2 DIABETES MELLITUS WITH STAGE 2 CHRONIC KIDNEY DISEASE, WITHOUT LONG-TERM CURRENT USE OF INSULIN (MULTI): Primary | ICD-10-CM

## 2023-11-13 DIAGNOSIS — N18.2 TYPE 2 DIABETES MELLITUS WITH STAGE 2 CHRONIC KIDNEY DISEASE, WITHOUT LONG-TERM CURRENT USE OF INSULIN (MULTI): Primary | ICD-10-CM

## 2023-11-13 DIAGNOSIS — Z51.5 HOSPICE CARE PATIENT: ICD-10-CM

## 2023-11-13 PROCEDURE — 99348 HOME/RES VST EST LOW MDM 30: CPT | Performed by: INTERNAL MEDICINE

## 2023-11-13 NOTE — PROGRESS NOTES
Follow-up visit  Patient has continued to decline.  His pulse ox is dropped he is requiring oxygen and his breathing is a bit labored at times.  Staff needed clarification on the different uses for morphine.  Poor oral intake on morning medications.    Medications and orders reviewed.    Physical exam  No recent vital signs obtained.  Patient's respirations appear to be 24-36.  His pulse is 90.  Pulse ox dropped into the 80s off of O2 so he was placed on supplemental O2  No apparent distress.  Patient is unresponsive to verbal stimuli or mild physical stimulation.  He appears mildly pale.  Lungs demonstrate mild mid expiratory rhonchi.  Heart rate rhythm is regular.  Abdomen is soft with no guarding or distention.  No peripheral edema.  He has multiple decubiti on the hips and sacral area.    Assessment and care plan  End-stage dementia with adult failure to thrive.  Patient is on hospice and continues to decline.  We will expand the use and clarify with staff that morphine can be used for pain shortness of breath, restlessness, or agitation.  Care plan has been discussed with hospice and the nursing staff.  Glucose trends have been within reasonable limits and he is on minimal doses of metformin daily so we will discontinue the metformin along with the famotidine and the aspirin.  We will discontinue Accu-Cheks and provide comfort care only at this point.  Hospice staff has discussed the situation with the patient's spouse

## 2023-11-13 NOTE — LETTER
Patient: Alessandra Dong  : 1935    Encounter Date: 2023    Follow-up visit  Patient has continued to decline.  His pulse ox is dropped he is requiring oxygen and his breathing is a bit labored at times.  Staff needed clarification on the different uses for morphine.  Poor oral intake on morning medications.    Medications and orders reviewed.    Physical exam  No recent vital signs obtained.  Patient's respirations appear to be 24-36.  His pulse is 90.  Pulse ox dropped into the 80s off of O2 so he was placed on supplemental O2  No apparent distress.  Patient is unresponsive to verbal stimuli or mild physical stimulation.  He appears mildly pale.  Lungs demonstrate mild mid expiratory rhonchi.  Heart rate rhythm is regular.  Abdomen is soft with no guarding or distention.  No peripheral edema.  He has multiple decubiti on the hips and sacral area.    Assessment and care plan  End-stage dementia with adult failure to thrive.  Patient is on hospice and continues to decline.  We will expand the use and clarify with staff that morphine can be used for pain shortness of breath, restlessness, or agitation.  Care plan has been discussed with hospice and the nursing staff.  Glucose trends have been within reasonable limits and he is on minimal doses of metformin daily so we will discontinue the metformin along with the famotidine and the aspirin.  We will discontinue Accu-Cheks and provide comfort care only at this point.  Hospice staff has discussed the situation with the patient's spouse      Electronically Signed By: Tanmay Burgos DO   23 11:45 AM